# Patient Record
Sex: FEMALE | Race: WHITE | Employment: FULL TIME | URBAN - METROPOLITAN AREA
[De-identification: names, ages, dates, MRNs, and addresses within clinical notes are randomized per-mention and may not be internally consistent; named-entity substitution may affect disease eponyms.]

---

## 2016-02-15 LAB — INR PPP: 1 (ref 0.9–1.1)

## 2016-03-15 LAB
CHOLEST SERPL-MCNC: 182 MG/DL (ref 0–199)
GLUCOSE SERPL-MCNC: 93 MG/DL (ref 60–100)
HDLC SERPL-MCNC: 64 MG/DL
LDLC SERPL CALC-MCNC: 95 MG/DL (ref 19–130)
TRIGL SERPL-MCNC: 114 MG/DL (ref 4–149)

## 2016-06-16 LAB
HPV ABSTRACT: NORMAL
PAP-ABSTRACT: ABNORMAL

## 2017-03-07 ENCOUNTER — TRANSFERRED RECORDS (OUTPATIENT)
Dept: HEALTH INFORMATION MANAGEMENT | Facility: CLINIC | Age: 27
End: 2017-03-07

## 2017-03-07 LAB
CHOLEST SERPL-MCNC: 168 MG/DL (ref 0–199)
GLUCOSE SERPL-MCNC: 89 MG/DL (ref 70–100)
HDLC SERPL-MCNC: 59 MG/DL
HEP C HIM: NORMAL
LDLC SERPL CALC-MCNC: 81 MG/DL (ref 19–130)
TRIGL SERPL-MCNC: 141 MG/DL (ref 4–149)

## 2017-03-28 ENCOUNTER — TRANSFERRED RECORDS (OUTPATIENT)
Dept: HEALTH INFORMATION MANAGEMENT | Facility: CLINIC | Age: 27
End: 2017-03-28

## 2018-02-26 ENCOUNTER — OFFICE VISIT (OUTPATIENT)
Dept: FAMILY MEDICINE | Facility: CLINIC | Age: 28
End: 2018-02-26
Payer: COMMERCIAL

## 2018-02-26 VITALS
BODY MASS INDEX: 26.33 KG/M2 | HEART RATE: 77 BPM | WEIGHT: 158 LBS | DIASTOLIC BLOOD PRESSURE: 70 MMHG | HEIGHT: 65 IN | OXYGEN SATURATION: 99 % | RESPIRATION RATE: 16 BRPM | TEMPERATURE: 98 F | SYSTOLIC BLOOD PRESSURE: 102 MMHG

## 2018-02-26 DIAGNOSIS — Z30.9 ENCOUNTER FOR CONTRACEPTIVE MANAGEMENT, UNSPECIFIED TYPE: ICD-10-CM

## 2018-02-26 DIAGNOSIS — Z86.39 HISTORY OF IRON DEFICIENCY: ICD-10-CM

## 2018-02-26 DIAGNOSIS — J30.9 ALLERGIC RHINITIS, UNSPECIFIED CHRONICITY, UNSPECIFIED SEASONALITY, UNSPECIFIED TRIGGER: ICD-10-CM

## 2018-02-26 DIAGNOSIS — N92.0 MENORRHAGIA WITH REGULAR CYCLE: ICD-10-CM

## 2018-02-26 DIAGNOSIS — Z00.00 ENCOUNTER FOR ROUTINE ADULT HEALTH EXAMINATION WITHOUT ABNORMAL FINDINGS: Primary | ICD-10-CM

## 2018-02-26 DIAGNOSIS — Z71.84 TRAVEL ADVICE ENCOUNTER: ICD-10-CM

## 2018-02-26 LAB
BASOPHILS # BLD AUTO: 0 10E9/L (ref 0–0.2)
BASOPHILS NFR BLD AUTO: 0.5 %
DIFFERENTIAL METHOD BLD: ABNORMAL
EOSINOPHIL # BLD AUTO: 0.2 10E9/L (ref 0–0.7)
EOSINOPHIL NFR BLD AUTO: 2.2 %
ERYTHROCYTE [DISTWIDTH] IN BLOOD BY AUTOMATED COUNT: 15.7 % (ref 10–15)
HCT VFR BLD AUTO: 39.8 % (ref 35–47)
HGB BLD-MCNC: 13.2 G/DL (ref 11.7–15.7)
LYMPHOCYTES # BLD AUTO: 3.3 10E9/L (ref 0.8–5.3)
LYMPHOCYTES NFR BLD AUTO: 43.6 %
MCH RBC QN AUTO: 28.7 PG (ref 26.5–33)
MCHC RBC AUTO-ENTMCNC: 33.2 G/DL (ref 31.5–36.5)
MCV RBC AUTO: 87 FL (ref 78–100)
MONOCYTES # BLD AUTO: 0.7 10E9/L (ref 0–1.3)
MONOCYTES NFR BLD AUTO: 8.6 %
NEUTROPHILS # BLD AUTO: 3.4 10E9/L (ref 1.6–8.3)
NEUTROPHILS NFR BLD AUTO: 45.1 %
PLATELET # BLD AUTO: 313 10E9/L (ref 150–450)
RBC # BLD AUTO: 4.6 10E12/L (ref 3.8–5.2)
SPECIMEN SOURCE: NORMAL
WBC # BLD AUTO: 7.6 10E9/L (ref 4–11)
WET PREP SPEC: NORMAL

## 2018-02-26 PROCEDURE — 87210 SMEAR WET MOUNT SALINE/INK: CPT | Performed by: FAMILY MEDICINE

## 2018-02-26 PROCEDURE — 36415 COLL VENOUS BLD VENIPUNCTURE: CPT | Performed by: FAMILY MEDICINE

## 2018-02-26 PROCEDURE — G0145 SCR C/V CYTO,THINLAYER,RESCR: HCPCS | Performed by: FAMILY MEDICINE

## 2018-02-26 PROCEDURE — 82728 ASSAY OF FERRITIN: CPT | Performed by: FAMILY MEDICINE

## 2018-02-26 PROCEDURE — 87591 N.GONORRHOEAE DNA AMP PROB: CPT | Performed by: FAMILY MEDICINE

## 2018-02-26 PROCEDURE — 87491 CHLMYD TRACH DNA AMP PROBE: CPT | Performed by: FAMILY MEDICINE

## 2018-02-26 PROCEDURE — 85025 COMPLETE CBC W/AUTO DIFF WBC: CPT | Performed by: FAMILY MEDICINE

## 2018-02-26 PROCEDURE — 99395 PREV VISIT EST AGE 18-39: CPT | Performed by: FAMILY MEDICINE

## 2018-02-26 RX ORDER — CIPROFLOXACIN 500 MG/1
500 TABLET, FILM COATED ORAL 2 TIMES DAILY
Qty: 6 TABLET | Refills: 0 | Status: SHIPPED | OUTPATIENT
Start: 2018-02-26 | End: 2019-03-07

## 2018-02-26 NOTE — PROGRESS NOTES
SUBJECTIVE:   CC: Patricia Cat is an 27 year old woman who presents for preventive health visit. New patient, works for [employer]. Transfer of care from [facility].     Healthy Habits:    Do you get at least three servings of calcium containing foods daily (dairy, green leafy vegetables, etc.)? yes    Amount of exercise or daily activities, outside of work: 3 day(s) per week    Problems taking medications regularly not applicable    Medication side effects: No    Have you had an eye exam in the past two years? yes    Do you see a dentist twice per year? 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 Previous care at [facility]. She has requested records be sent here.    Past/recent records reviewed and discussed for --   -slip disk in lower lumbar region that she followed with PT for.  -allergies- followed with allergy testing. Primarily allergic to dust, dogs, and cats but there are many more.  - hx of low iron from menorrhagia that is now resolved.    Birth control/periods: Pt thinks ocp caused depression. When she d/c'ed ocp sx's completely resolved. Most recent ocp was trinessa. Pt has considered IUD but is worried about pain with insertion. Currently using condoms  -Periods occur every 3 weeks. The pill helped with cycle control. Pt says she feels like she loses a lot of hair and is tired, but thinks these are both within normal limits.   She has cramping in low abdomen- even while not on her period but it is very sporadic, mild. She had std screening completed at her physical last year and has been with the same partner, so she doesn't think there is concern for std exposure. Would like std screening completed today.     Allergies: She says ears have been itchy on the inside and is wondering if this is due to allergies. Admits to using  q-tips.  Using Flonase, but recently stopped using this because she was getting bloody noses.      Weight: has been stable for the last 1-2 years. She attends aerobic classes at the gym and lifts weight for exercise.   Wt Readings from Last 5 Encounters:   02/26/18 71.7 kg (158 lb)       Pt has sporadic cp and is unsure why. She says the feeling is fleeting and she is not concerned. Not sure what triggers the pain  Denies: cp with exertion, heart burn,     Others:   -While at work she cut her finger. She has been treated with anti-HIV med and is completing the f/u screening.    -Pt will be traveling to [place] in one month and is wondering what vaccines she needs to get.  -Supplementing daily vitamin with iron and vitamin D.      Today's PHQ-2 Score:   PHQ-2 ( 1999 Pfizer) 2/26/2018   Q1: Little interest or pleasure in doing things 0   Q2: Feeling down, depressed or hopeless 0   PHQ-2 Score 0       Abuse: Current or Past(Physical, Sexual or Emotional)- No  Do you feel safe in your environment - Yes    Social History   Substance Use Topics     Smoking status: Never Smoker     Smokeless tobacco: Never Used     Alcohol use Yes      Comment: 5 drinks/week     If you drink alcohol do you typically have >3 drinks per day or >7 drinks per week? Socially.                     Reviewed orders with patient.  Reviewed health maintenance and updated orders accordingly - Yes  Labs reviewed in EPIC  BP Readings from Last 3 Encounters:   02/26/18 102/70    Wt Readings from Last 3 Encounters:   02/26/18 71.7 kg (158 lb)                  Patient Active Problem List   Diagnosis     Allergic rhinitis, unspecified chronicity, unspecified seasonality, unspecified trigger     Past Surgical History:   Procedure Laterality Date     ORAL SURGERY REFERRAL      surgery       Social History   Substance Use Topics     Smoking status: Never Smoker     Smokeless tobacco: Never Used     Alcohol use Yes      Comment: 5 drinks/week     Family History  "  Problem Relation Age of Onset     HEART DISEASE Maternal Grandmother      Skin Cancer Maternal Grandfather      Alzheimer Disease Maternal Grandfather      Alzheimer Disease Paternal Grandmother            Mammogram not appropriate for this patient based on age.    Pertinent mammograms are reviewed under the imaging tab.  History of abnormal Pap smear: NO - age 21-29 PAP every 3 years recommended    Reviewed and updated as needed this visit by clinical staff  Tobacco  Allergies  Meds  Med Hx  Surg Hx  Fam Hx  Soc Hx        Reviewed and updated as needed this visit by Provider Tobacco  Allergies  Meds  Med Hx  Surg Hx  Fam Hx  Soc Hx             ROS:   ROS: 10 point ROS neg other than the symptoms noted above in the HPI.    This document serves as a record of the services and decisions personally performed and made by Debbie Tan MD. It was created on her behalf by Georgette Adamson, a trained medical scribe. The creation of this document is based the provider's statements to the medical scribe.  Georgette Adamson February 26, 2018 3:31 PM      OBJECTIVE:   /70 (BP Location: Right arm, Patient Position: Sitting, Cuff Size: Adult Regular)  Pulse 77  Temp 98  F (36.7  C) (Oral)  Resp 16  Ht 1.651 m (5' 5\")  Wt 71.7 kg (158 lb)  LMP 02/20/2018  SpO2 99%  BMI 26.29 kg/m2  EXAM:  GENERAL: healthy, alert and no distress, overweight  EYES: Eyes grossly normal to inspection, PERRL and conjunctivae and sclerae normal  HENT: ear canals and TM's normal, nose and mouth without ulcers or lesions  NECK: no adenopathy, no asymmetry, masses, or scars and thyroid normal to palpation  RESP: lungs clear to auscultation - no rales, rhonchi or wheezes  BREAST: normal without masses, tenderness or nipple discharge and no palpable axillary masses or adenopathy  CV: regular rate and rhythm, normal S1 S2, no S3 or S4, no murmur, click or rub, no peripheral edema and peripheral pulses strong, verucose " veins in RLE present  ABDOMEN: soft, nontender, no hepatosplenomegaly, no masses and bowel sounds normal   (female): normal female external genitalia, normal urethral meatus, vaginal mucosa pink, moist, well rugated, and normal cervix/adnexa/uterus without masses or discharge, mild tenderness  But no masses in adnexa bilaterally with internal exam  SKIN: no suspicious lesions or rashes  PSYCH: mentation appears normal, affect normal/bright    No results found for this or any previous visit (from the past 24 hour(s)).    ASSESSMENT/PLAN:   1. Encounter for routine adult health examination without abnormal findings  Consider pelvic u/s- no recent pain this month, so declines for now. Will update me if sx's intensify.   Avoid q-tips in ears.   - Wet prep  - Neisseria gonorrhoeae PCR  - Chlamydia trachomatis PCR  - Pap imaged thin layer screen reflex to HPV if ASCUS - recommend age 25 - 29    2. Allergic rhinitis, unspecified chronicity, unspecified seasonality, unspecified trigger  advised using Flonase one spray daily. Discussed spraying away from septum with cross arm technique to avoid nose bleeds.    3. Travel advice encounter   traveler's diarrhea- script for cipro given to be used prn while in [place]. Discussed bringing immodium.  Reviewed timing of taking, onset, benefits, monitoring and typicall and severe AE of the medication.   - ciprofloxacin (CIPRO) 500 MG tablet; Take 1 tablet (500 mg) by mouth 2 times daily  Dispense: 6 tablet; Refill: 0    4. History of iron deficiency  - CBC with platelets differential  - Ferritin    5. Menorrhagia with regular cycle  6. Encounter for contraceptive management, unspecified type   pt has tried oral contraceptives in the past with AE of depression so she d/c'ed. Discussed other forms of birth control with pt and she is  onsidering Mirena IUD. If she wishes to have IUD placed she will f/u with Dr. Tolentino or Dr. Shaw for insertion.  Side affects, use, risk, and  benefits discussed in detail, and pt. verbalizes understanding of these.         Patient Instructions   Recommend using oil drops for wax.     Use Flonase one spray daily-  opposite hand for opposite nostril.    Recommend compression stockings for verucose veins.    If you decide you would like IUD placed follow up with Dr. Tolentino or Dr. Shaw.     Preventive Health Recommendations  Female Ages 26 - 39  Yearly exam:   See your health care provider every year in order to    Review health changes.     Discuss preventive care.      Review your medicines if you your doctor has prescribed any.    Until age 30: Get a Pap test every three years (more often if you have had an abnormal result).    After age 30: Talk to your doctor about whether you should have a Pap test every 3 years or have a Pap test with HPV screening every 5 years.   You do not need a Pap test if your uterus was removed (hysterectomy) and you have not had cancer.  You should be tested each year for STDs (sexually transmitted diseases), if you're at risk.   Talk to your provider about how often to have your cholesterol checked.  If you are at risk for diabetes, you should have a diabetes test (fasting glucose).  Shots: Get a flu shot each year. Get a tetanus shot every 10 years.   Nutrition:     Eat at least 5 servings of fruits and vegetables each day.    Eat whole-grain bread, whole-wheat pasta and brown rice instead of white grains and rice.    Talk to your provider about Calcium and Vitamin D.     Lifestyle    Exercise at least 150 minutes a week (30 minutes a day, 5 days of the week). This will help you control your weight and prevent disease.    Limit alcohol to one drink per day.    No smoking.     Wear sunscreen to prevent skin cancer.    See your dentist every six months for an exam and cleaning.        COUNSELING:   Reviewed preventive health counseling, as reflected in patient instructions       Regular exercise       Healthy  "diet/nutrition       Vision screening       Hearing screening       Immunizations         Alcohol Use       Contraception       Family planning       Safe sex practices/STD prevention         reports that she has never smoked. She has never used smokeless tobacco.    Estimated body mass index is 26.29 kg/(m^2) as calculated from the following:    Height as of this encounter: 1.651 m (5' 5\").    Weight as of this encounter: 71.7 kg (158 lb).   Weight management plan: Discussed healthy diet and exercise guidelines and patient will follow up in 12 months in clinic to re-evaluate.    Counseling Resources:  ATP IV Guidelines  Pooled Cohorts Equation Calculator  Breast Cancer Risk Calculator  FRAX Risk Assessment  ICSI Preventive Guidelines  Dietary Guidelines for Americans, 2010  USDA's MyPlate  ASA Prophylaxis  Lung CA Screening    The information in this document, created by the medical scribe for me, accurately reflects the services I personally performed and the decisions made by me. I have reviewed and approved this document for accuracy.   MD Debbie Canchola MD  [facility]  "

## 2018-02-26 NOTE — MR AVS SNAPSHOT
After Visit Summary   2/26/2018    Patricia Cat    MRN: [id number]           Patient Information     Date Of Birth          1990        Visit Information        Provider Department      2/26/2018 3:20 PM Debbie Tan MD [facility]        Today's Diagnoses     Encounter for routine adult health examination without abnormal findings    -  1    Allergic rhinitis, unspecified chronicity, unspecified seasonality, unspecified trigger        Travel advice encounter        History of iron deficiency        Menorrhagia with regular cycle        Encounter for contraceptive management, unspecified type          Care Instructions    Recommend using oil drops for wax.     Use Flonase one spray daily-  opposite hand for opposite nostril.    Recommend compression stockings for verucose veins.    If you decide you would like IUD placed follow up with Dr. Tolentino or Dr. Shaw.     Preventive Health Recommendations  Female Ages 26 - 39  Yearly exam:   See your health care provider every year in order to    Review health changes.     Discuss preventive care.      Review your medicines if you your doctor has prescribed any.    Until age 30: Get a Pap test every three years (more often if you have had an abnormal result).    After age 30: Talk to your doctor about whether you should have a Pap test every 3 years or have a Pap test with HPV screening every 5 years.   You do not need a Pap test if your uterus was removed (hysterectomy) and you have not had cancer.  You should be tested each year for STDs (sexually transmitted diseases), if you're at risk.   Talk to your provider about how often to have your cholesterol checked.  If you are at risk for diabetes, you should have a diabetes test (fasting glucose).  Shots: Get a flu shot each year. Get a tetanus shot every 10 years.   Nutrition:     Eat at least 5 servings of fruits and vegetables each day.    Eat whole-grain  "bread, whole-wheat pasta and brown rice instead of white grains and rice.    Talk to your provider about Calcium and Vitamin D.     Lifestyle    Exercise at least 150 minutes a week (30 minutes a day, 5 days of the week). This will help you control your weight and prevent disease.    Limit alcohol to one drink per day.    No smoking.     Wear sunscreen to prevent skin cancer.    See your dentist every six months for an exam and cleaning.            Follow-ups after your visit        Who to contact     If you have questions or need follow up information about today's clinic visit or your schedule please contact [facility] directly at [phone].  Normal or non-critical lab and imaging results will be communicated to you by MyChart, letter or phone within 4 business days after the clinic has received the results. If you do not hear from us within 7 days, please contact the clinic through [employer] or phone. If you have a critical or abnormal lab result, we will notify you by phone as soon as possible.  Submit refill requests through [employer] or call your pharmacy and they will forward the refill request to us. Please allow 3 business days for your refill to be completed.          Additional Information About Your Visit        Infer[employer] Information     [employer] lets you send messages to your doctor, view your test results, renew your prescriptions, schedule appointments and more. To sign up, go to [email or web address] . Click on \"Log in\" on the left side of the screen, which will take you to the Welcome page. Then click on \"Sign up Now\" on the right side of the page.     You will be asked to enter the access code listed below, as well as some personal information. Please follow the directions to create your username and password.     Your access code is: [email or web address]  Expires: 2018  4:12 PM     Your access code will  in 90 days. If you need help or a new code, please call your [facility] clinic " "or [phone].        Care EveryWhere ID     This is your Care EveryWhere ID. This could be used by other organizations to access your [facility] medical records  [id number]        Your Vitals Were     Pulse Temperature Respirations Height Last Period Pulse Oximetry    77 98  F (36.7  C) (Oral) 16 1.651 m (5' 5\") 02/20/2018 99%    BMI (Body Mass Index)                   26.29 kg/m2            Blood Pressure from Last 3 Encounters:   02/26/18 102/70    Weight from Last 3 Encounters:   02/26/18 71.7 kg (158 lb)              We Performed the Following     CBC with platelets differential     Chlamydia trachomatis PCR     Ferritin     Neisseria gonorrhoeae PCR     Pap imaged thin layer screen reflex to HPV if ASCUS - recommend age 25 - 29     Wet prep          Today's Medication Changes          These changes are accurate as of 2/26/18  4:12 PM.  If you have any questions, ask your nurse or doctor.               Start taking these medicines.        Dose/Directions    ciprofloxacin 500 MG tablet   Commonly known as:  CIPRO   Used for:  Travel advice encounter   Started by:  Debbie Tan MD        Dose:  500 mg   Take 1 tablet (500 mg) by mouth 2 times daily   Quantity:  6 tablet   Refills:  0            Where to get your medicines      These medications were sent to [facility] - [address]  [address] [facility] [address], [place] [address]     Phone:  [phone]     ciprofloxacin 500 MG tablet                Primary Care Provider Office Phone # Fax #    [facility] [phone] [address] [facility] [address]        Equal Access to Services     [facility]: Hadii aad ku hadasho Sovicente, waaxda luqadaha, qaybta kaalmada jihan moffett . So [facility] [phone].    ATENCIÓN: Si habla español, tiene a gutiérrez disposición servicios gratuitos de asistencia lingüística. Llame al [phone].    We comply " with applicable federal civil rights laws and [place] laws. We do not discriminate on the basis of race, color, national origin, age, disability, sex, sexual orientation, or gender identity.            Thank you!     Thank you for choosing [facility]  for your care. Our goal is always to provide you with excellent care. Hearing back from our patients is one way we can continue to improve our services. Please take a few minutes to complete the written survey that you may receive in the mail after your visit with us. Thank you!             Your Updated Medication List - Protect others around you: Learn how to safely use, store and throw away your medicines at www.disposemymeds.org.          This list is accurate as of 2/26/18  4:12 PM.  Always use your most recent med list.                   Brand Name Dispense Instructions for use Diagnosis    ciprofloxacin 500 MG tablet    CIPRO    6 tablet    Take 1 tablet (500 mg) by mouth 2 times daily    Travel advice encounter

## 2018-02-26 NOTE — NURSING NOTE
"Chief Complaint   Patient presents with     Physical       Initial Ht 1.651 m (5' 5\")  Wt 71.7 kg (158 lb)  LMP 02/20/2018  BMI 26.29 kg/m2 Estimated body mass index is 26.29 kg/(m^2) as calculated from the following:    Height as of this encounter: 1.651 m (5' 5\").    Weight as of this encounter: 71.7 kg (158 lb).  Medication Reconciliation: conrado Sanders, Medical Assistant      "

## 2018-02-26 NOTE — PATIENT INSTRUCTIONS
Recommend using oil drops for wax.     cipro for travel- Start this antibiotic if you develop three or more loose stools in an eight hour period, especially if the diarrhea is associated with nausea, vomiting, abdominal cramping, fever or blood in the stool.       Use Flonase one spray daily-  opposite hand for opposite nostril.    Recommend compression stockings for verucose veins.    If you decide you would like IUD placed follow up with Dr. Tolentino or Dr. Shaw.     Preventive Health Recommendations  Female Ages 26 - 39  Yearly exam:   See your health care provider every year in order to    Review health changes.     Discuss preventive care.      Review your medicines if you your doctor has prescribed any.    Until age 30: Get a Pap test every three years (more often if you have had an abnormal result).    After age 30: Talk to your doctor about whether you should have a Pap test every 3 years or have a Pap test with HPV screening every 5 years.   You do not need a Pap test if your uterus was removed (hysterectomy) and you have not had cancer.  You should be tested each year for STDs (sexually transmitted diseases), if you're at risk.   Talk to your provider about how often to have your cholesterol checked.  If you are at risk for diabetes, you should have a diabetes test (fasting glucose).  Shots: Get a flu shot each year. Get a tetanus shot every 10 years.   Nutrition:     Eat at least 5 servings of fruits and vegetables each day.    Eat whole-grain bread, whole-wheat pasta and brown rice instead of white grains and rice.    Talk to your provider about Calcium and Vitamin D.     Lifestyle    Exercise at least 150 minutes a week (30 minutes a day, 5 days of the week). This will help you control your weight and prevent disease.    Limit alcohol to one drink per day.    No smoking.     Wear sunscreen to prevent skin cancer.    See your dentist every six months for an exam and cleaning.

## 2018-02-27 LAB
C TRACH DNA SPEC QL NAA+PROBE: NEGATIVE
FERRITIN SERPL-MCNC: 22 NG/ML (ref 12–150)
N GONORRHOEA DNA SPEC QL NAA+PROBE: NEGATIVE
SPECIMEN SOURCE: NORMAL
SPECIMEN SOURCE: NORMAL

## 2018-02-28 LAB
COPATH REPORT: NORMAL
PAP: NORMAL

## 2018-03-15 ENCOUNTER — VIRTUAL VISIT (OUTPATIENT)
Dept: FAMILY MEDICINE | Facility: OTHER | Age: 28
End: 2018-03-15

## 2018-03-15 NOTE — PROGRESS NOTES
"Date:   Clinician: Janie Barros  Clinician NPI: [id number]  Patient: Patricia Cat  Patient : 1990  Patient Address: [address] [place], [place] [address] [address]  Patient Phone: [phone]  Visit Protocol: Yeast infection  Patient Summary:  Patricia is a 27 year old ( : 1990 ) female who initiated a Visit for a presumed vaginal yeast infection. When asked the question \"Please sign me up to receive news, health information and promotions from [employer].\", Patricia responded \"No\".    0-5 days ago, she began noticing perivulvar pruritus.   She denies having open sores, perivulvar rash, vaginal pruritus, vaginal discharge, and abdominal pain. She also denies feeling feverish.   She has had one (1) occurrence in the past year and the symptoms are NOT similar to previous yeast infections. She has not tried to treat her current symptoms with any medication.   She denies taking antibiotics in the past 2 weeks. She prefers a fluconazole (Diflucan) Pill.   She denies pregnancy and denies breastfeeding. She has menstruated in the past month.   She denies risk factors for sexually transmitted infections. She does NOT smoke or use smokeless tobacco.   Additional information as reported by the patient (free text): This started on 3/13/2018 and have been taking AZO yeast plus. I don't have much itching, just burning when I urinate. It seems to be centralized between my vagina and anus. I do notice the toilet paper is a little pink when I wipe so I suspect there is some very slight bleeding but it doesn't happen every time. I also have no discharge at all, everything is very dry. My most recent doctors visit was on 2018 and I had an STD screen and I was negative, there is no chance that could be it.     MEDICATIONS:    Patient free text response: Xyzal   , ALLERGIES:  NKDA   Clinician Response:  Dear Patricia,  Based on the information you have provided, you likely have a vaginal yeast infection which is " a common infection of the vagina caused by a fungus.  I am prescribing:   Fluconazole (Diflucan) 150 mg oral tablet to treat your yeast infection. Swallow one (1) tablet as a single dose. There are no refills with this prescription.   While you have yeast infection symptoms, do the following:      Avoid irritants such as scented bath products, tampons, pads, or vaginal sprays and powders.    Avoid douching.    Wear cotton underwear and if you are comfortable doing so, do not wear underwear to bed.    Avoid hot tubs and whirlpool spas.     Most women notice improvement in their symptoms within 1-2 days after starting treatment with complete clearing in 5-7 days. If your symptoms have not improved in 3 days or not resolved in 10 days, please schedule an appointment to see your primary care provider for an evaluation as your symptoms may be caused by an infection other than yeast. Sometimes yeast infections can be associated with other vaginal infections or with sexually transmitted infections (STIs). If you think you may be at risk for a STI please be seen in a clinic.   Diagnosis: Candida Vulvovaginitis  Diagnosis ICD: B37.3  Prescription: fluconazole (Diflucan) 150mg oral tablet 1 tablet, 1 days supply. Take one tablet by mouth one time a day for 1 day. Refills: 0, Refill as needed: no, Allow substitutions: yes  Pharmacy: [id number] - [phone] - [address] [place] [address]

## 2018-06-04 ENCOUNTER — OFFICE VISIT (OUTPATIENT)
Dept: URGENT CARE | Facility: URGENT CARE | Age: 28
End: 2018-06-04
Payer: COMMERCIAL

## 2018-06-04 VITALS
SYSTOLIC BLOOD PRESSURE: 111 MMHG | RESPIRATION RATE: 16 BRPM | HEART RATE: 62 BPM | TEMPERATURE: 97.8 F | DIASTOLIC BLOOD PRESSURE: 71 MMHG | OXYGEN SATURATION: 100 %

## 2018-06-04 DIAGNOSIS — J03.90 TONSILLITIS: ICD-10-CM

## 2018-06-04 DIAGNOSIS — R07.0 THROAT PAIN: Primary | ICD-10-CM

## 2018-06-04 LAB
DEPRECATED S PYO AG THROAT QL EIA: NORMAL
SPECIMEN SOURCE: NORMAL

## 2018-06-04 PROCEDURE — 99213 OFFICE O/P EST LOW 20 MIN: CPT

## 2018-06-04 PROCEDURE — 87880 STREP A ASSAY W/OPTIC: CPT | Performed by: FAMILY MEDICINE

## 2018-06-04 PROCEDURE — 87081 CULTURE SCREEN ONLY: CPT | Performed by: FAMILY MEDICINE

## 2018-06-04 NOTE — MR AVS SNAPSHOT
After Visit Summary   6/4/2018    Patricia Cat    MRN: [id number]           Patient Information     Date Of Birth          1990        Visit Information        Provider Department      6/4/2018 7:30 PM  URGENT CARE [facility]        Today's Diagnoses     Throat pain    -  1    Tonsillitis          Care Instructions      Tonsillitis (Child)    Tonsillitis is an inflammation or infection of your child's tonsils. Your child has 2 tonsils, 1 on either side of his or her throat. The tonsils are large, oval glands. They help prevent infections. But tonsils can become infected themselves. Tonsillitis is a common childhood condition.  Tonsillitis can be caused by bacteria or a virus. The main symptom is a sore throat. Your child may also have a fever, throat redness or swelling, or trouble swallowing. The tonsils may also look white, gray, or yellow.  If your child has a bacterial infection, antibiotics may be prescribed. Antibiotics don t work against viral infections. In some cases of a viral infection, an antiviral medicine may be prescribed. Once the problem has been treated, your child may need surgery to remove the tonsils if they become infected often or cause breathing problems.  Home care  If your child s healthcare provider has prescribed antibiotics or another medicine, give it to your child as directed. Be sure your child finishes all of the medicine, even if he or she feels better.  To help ease your child s sore throat:    Give acetaminophen or ibuprofen. Follow the package instructions for giving these to a child. (Do not give aspirin to anyone younger than 18 years old who is ill with a fever. It may cause severe liver damage.)    Offer cool liquids to drink.    Have your child gargle with warm salt water. An over-the-counter throat-numbing spray may also help.  The germs that cause tonsillitis are very contagious. To help prevent their spread, follow these  tips:    Teach your child to wash his or her hands often.    Don t let your child share cups or utensils with other people.    Keep your child away from other children until he or she is better.  Follow-up care  Follow up with your child's healthcare provider, or as advised.  When to seek medical advice  Unless advised otherwise, call your child's healthcare provider if:    Your child is 3 months old or younger and has a fever of 100.4 F (38 C) or higher. Your child may need to see a healthcare provider.    Your child is of any age and has fevers higher than 104 F (40 C) that come back again and again.  Also call if any of the following occur:    Child has a sore throat for more than 2 days    Child has a sore throat with fever, headache, stomachache, or rash    Child has neck pain    Child has a seizure    Child is acting strangely    Child has trouble swallowing or breathing    Child can t open his or her mouth fully  Date Last Reviewed: 10/1/2017    [id number] The [employer]. [address] [place] [address]. All rights reserved. This information is not intended as a substitute for professional medical care. Always follow your healthcare professional's instructions.                Follow-ups after your visit        Who to contact     If you have questions or need follow up information about today's clinic visit or your schedule please contact [facility] directly at [phone].  Normal or non-critical lab and imaging results will be communicated to you by MyChart, letter or phone within 4 business days after the clinic has received the results. If you do not hear from us within 7 days, please contact the clinic through MyChart or phone. If you have a critical or abnormal lab result, we will notify you by phone as soon as possible.  Submit refill requests through [employer] or call your pharmacy and they will forward the refill request to us. Please allow 3 business days  for your refill to be completed.          Additional Information About Your Visit        MyChart Information     [employer] gives you secure access to your electronic health record. If you see a primary care provider, you can also send messages to your care team and make appointments. If you have questions, please call your primary care clinic.  If you do not have a primary care provider, please call [phone] and they will assist you.        Care EveryWhere ID     This is your Care EveryWhere ID. This could be used by other organizations to access your [facility] medical records  [id number]        Your Vitals Were     Pulse Temperature Respirations Pulse Oximetry          62 97.8  F (36.6  C) (Tympanic) 16 100%         Blood Pressure from Last 3 Encounters:   06/04/18 111/71   02/26/18 102/70    Weight from Last 3 Encounters:   02/26/18 158 lb (71.7 kg)              We Performed the Following     Strep, Rapid Screen          Today's Medication Changes          These changes are accurate as of 6/4/18  8:05 PM.  If you have any questions, ask your nurse or doctor.               Start taking these medicines.        Dose/Directions    amoxicillin-clavulanate 875-125 MG per tablet   Commonly known as:  AUGMENTIN   Used for:  Tonsillitis        Dose:  1 tablet   Take 1 tablet by mouth 2 times daily   Quantity:  20 tablet   Refills:  0            Where to get your medicines      These medications were sent to [facility] [address] [place] [address] [address] [place] [id number]    Hours:  24-hours Phone:  [phone]     amoxicillin-clavulanate 875-125 MG per tablet                Primary Care Provider Office Phone # Fax #    [facility] [phone] [address] [facility] [address]        Equal Access to Services     IOANA MAST AH: Zoie Jack, balta leon, jihan pace  karely dugganCliftonaaheydi ah. So [facility] [phone].    ATENCIÓN: Si habla javid, tiene a gutiérrez disposición servicios gratuitos de asistencia lingüística. Ester al [phone].    We comply with applicable federal civil rights laws and [place] laws. We do not discriminate on the basis of race, color, national origin, age, disability, sex, sexual orientation, or gender identity.            Thank you!     Thank you for choosing [facility]  for your care. Our goal is always to provide you with excellent care. Hearing back from our patients is one way we can continue to improve our services. Please take a few minutes to complete the written survey that you may receive in the mail after your visit with us. Thank you!             Your Updated Medication List - Protect others around you: Learn how to safely use, store and throw away your medicines at www.disposemymeds.org.          This list is accurate as of 6/4/18  8:05 PM.  Always use your most recent med list.                   Brand Name Dispense Instructions for use Diagnosis    amoxicillin-clavulanate 875-125 MG per tablet    AUGMENTIN    20 tablet    Take 1 tablet by mouth 2 times daily    Tonsillitis       ciprofloxacin 500 MG tablet    CIPRO    6 tablet    Take 1 tablet (500 mg) by mouth 2 times daily    Travel advice encounter

## 2018-06-05 LAB
BACTERIA SPEC CULT: NORMAL
SPECIMEN SOURCE: NORMAL

## 2018-06-05 NOTE — PROGRESS NOTES
SUBJECTIVE:  Chief Complaint   Patient presents with     Urgent Care     Mild stratchy and white throat     Patricia Cat is a 27 year old female   with a chief complaint of sore throat.  Onset of symptoms was 1 day(s) ago.    Course of illness: sudden onset, still present and worsening.  Severity moderate  Current and Associated symptoms: sore throat and headache  Treatment measures tried include None tried.  Predisposing factors include None.  Has   history of recurrent tonsillitis    Past Medical History:   Diagnosis Date     Anxiety      Chlamydia      Depression     stopped ocp (trinessa) and sx's improved. anorgasmia from zoloft, celexa 40 mg was ineffective.      Disc disorder     low spine, s/p PT     Iron deficiency anemia     thought due to heavy menses in past     Lumbosacral radiculopathy at L5      Patient Active Problem List   Diagnosis     Allergic rhinitis, unspecified chronicity, unspecified seasonality, unspecified trigger     Menorrhagia with regular cycle     History of iron deficiency         ALLERGIES:  Review of patient's allergies indicates no known allergies.      Current Outpatient Prescriptions on File Prior to Visit:  ciprofloxacin (CIPRO) 500 MG tablet Take 1 tablet (500 mg) by mouth 2 times daily     No current facility-administered medications on file prior to visit.     Social History   Substance Use Topics     Smoking status: Never Smoker     Smokeless tobacco: Never Used     Alcohol use Yes      Comment: 5 drinks/week       Family History   Problem Relation Age of Onset     HEART DISEASE Maternal Grandmother      Skin Cancer Maternal Grandfather      Alzheimer Disease Maternal Grandfather      Alzheimer Disease Paternal Grandmother          ROS:  CONSTITUTIONAL:NEGATIVE for fever, chills, change in weight  INTEGUMENTARY/SKIN: NEGATIVE for worrisome rashes, moles or lesions  EYES: NEGATIVE for vision changes or irritation  RESP:NEGATIVE for significant cough or SOB    OBJECTIVE:    /71  Pulse 62  Temp 97.8  F (36.6  C) (Tympanic)  Resp 16  SpO2 100%  GENERAL APPEARANCE: alert, moderate distress and cooperative  HENT: Right side has tonsillar hypertrophy, tonsillar erythema and tonsillar exudate   HENT: ear canals and TM's normal.  Nose normal.  Pharynx erythematous with some exudate noted.,  NECK: supple, non-tender to palpation, no adenopathy noted  EYES: EOMI,  PERRL, conjunctiva clear  SKIN- no rashes or sores  RESP: lungs clear to auscultation - no rales, rhonchi or wheezes  CV: regular rates and rhythm, normal S1 S2, no murmer noted, ,SKIN: no suspicious lesions or rashes  MS-  Motor and sensation in extremities appears grossly normal  NEURO-  Speech, mentation, mobility seems normal and appropriate      Rapid Strep test is negative    ASSESSMENT:  Throat pain     - Strep, Rapid Screen  - Beta strep group A culture    Tonsillitis      - amoxicillin-clavulanate (AUGMENTIN) 875-125 MG per tablet; Take 1 tablet by mouth 2 times daily     I discussed with the patient that a confirmatory strep culture will be performed and that she will be called if the culture is positive for strep.    We discussed   possible causes of tonsillitis besides strep throat including bacterial tonsillitis,  cold viruses and mononucleosis.    Discussed that  Tonsillitis caused by mono or cold viruses will not respond to antibiotics    We discussed that there is no test for bacterial tonsillitis and that the diagnosis is confirmed if the inflamed tonsils respond to antibiotic treatment.  Bacterial tonsillitis tends to develop in people with tonsils that have a surface that lets food particles get trapped in the tonsil or when there are other infections in the mouth that spread to the tonsil.    Discussed that people with chronic bacterial tonsillitis often have tonsilliths-  Whitish  Lumps in the crypts of the tonsils that are a mixture of food, bacteria and dead mucosal cells    Symptomatic treatment  with gargles, lozenges, and OTC analgesic (acetaminophen/ ibuprofen)   as needed. Follow-up with primary clinic if not improving.

## 2019-03-04 ASSESSMENT — ENCOUNTER SYMPTOMS
HEMATOCHEZIA: 0
JOINT SWELLING: 0
HEADACHES: 0
PALPITATIONS: 0
HEMATURIA: 0
MYALGIAS: 0
ABDOMINAL PAIN: 0
FREQUENCY: 0
PARESTHESIAS: 0
FEVER: 0
DIZZINESS: 0
NAUSEA: 0
WEAKNESS: 0
SHORTNESS OF BREATH: 0
COUGH: 0
DIARRHEA: 0
CONSTIPATION: 0
SORE THROAT: 0
NERVOUS/ANXIOUS: 0
CHILLS: 0
HEARTBURN: 0
DYSURIA: 0
ARTHRALGIAS: 0
EYE PAIN: 0
BREAST MASS: 0

## 2019-03-07 ENCOUNTER — OFFICE VISIT (OUTPATIENT)
Dept: FAMILY MEDICINE | Facility: CLINIC | Age: 29
End: 2019-03-07
Payer: COMMERCIAL

## 2019-03-07 VITALS
OXYGEN SATURATION: 100 % | HEART RATE: 78 BPM | TEMPERATURE: 98.1 F | HEIGHT: 65 IN | RESPIRATION RATE: 16 BRPM | SYSTOLIC BLOOD PRESSURE: 102 MMHG | WEIGHT: 166 LBS | DIASTOLIC BLOOD PRESSURE: 72 MMHG | BODY MASS INDEX: 27.66 KG/M2

## 2019-03-07 DIAGNOSIS — R06.81 APNEA: ICD-10-CM

## 2019-03-07 DIAGNOSIS — N92.0 MENORRHAGIA WITH REGULAR CYCLE: ICD-10-CM

## 2019-03-07 DIAGNOSIS — N92.0 SPOTTING: ICD-10-CM

## 2019-03-07 DIAGNOSIS — L68.9 EXCESSIVE HAIR GROWTH: ICD-10-CM

## 2019-03-07 DIAGNOSIS — Z00.00 ENCOUNTER FOR ROUTINE ADULT HEALTH EXAMINATION WITHOUT ABNORMAL FINDINGS: Primary | ICD-10-CM

## 2019-03-07 DIAGNOSIS — R06.83 SNORING: ICD-10-CM

## 2019-03-07 LAB
CHOLEST SERPL-MCNC: 151 MG/DL
FERRITIN SERPL-MCNC: 31 NG/ML (ref 12–150)
GLUCOSE SERPL-MCNC: 89 MG/DL (ref 70–99)
HDLC SERPL-MCNC: 63 MG/DL
HGB BLD-MCNC: 13.6 G/DL (ref 11.7–15.7)
LDLC SERPL CALC-MCNC: 80 MG/DL
NONHDLC SERPL-MCNC: 88 MG/DL
PROLACTIN SERPL-MCNC: 8 UG/L (ref 3–27)
SPECIMEN SOURCE: NORMAL
TRIGL SERPL-MCNC: 42 MG/DL
TSH SERPL DL<=0.005 MIU/L-ACNC: 1.48 MU/L (ref 0.4–4)
WET PREP SPEC: NORMAL

## 2019-03-07 PROCEDURE — 82627 DEHYDROEPIANDROSTERONE: CPT | Performed by: FAMILY MEDICINE

## 2019-03-07 PROCEDURE — 99213 OFFICE O/P EST LOW 20 MIN: CPT | Mod: 25 | Performed by: FAMILY MEDICINE

## 2019-03-07 PROCEDURE — 87591 N.GONORRHOEAE DNA AMP PROB: CPT | Performed by: FAMILY MEDICINE

## 2019-03-07 PROCEDURE — 84443 ASSAY THYROID STIM HORMONE: CPT | Performed by: FAMILY MEDICINE

## 2019-03-07 PROCEDURE — 87491 CHLMYD TRACH DNA AMP PROBE: CPT | Performed by: FAMILY MEDICINE

## 2019-03-07 PROCEDURE — 36415 COLL VENOUS BLD VENIPUNCTURE: CPT | Performed by: FAMILY MEDICINE

## 2019-03-07 PROCEDURE — 99395 PREV VISIT EST AGE 18-39: CPT | Mod: 25 | Performed by: FAMILY MEDICINE

## 2019-03-07 PROCEDURE — 84403 ASSAY OF TOTAL TESTOSTERONE: CPT | Performed by: FAMILY MEDICINE

## 2019-03-07 PROCEDURE — 80061 LIPID PANEL: CPT | Performed by: FAMILY MEDICINE

## 2019-03-07 PROCEDURE — 82947 ASSAY GLUCOSE BLOOD QUANT: CPT | Performed by: FAMILY MEDICINE

## 2019-03-07 PROCEDURE — 90471 IMMUNIZATION ADMIN: CPT | Performed by: FAMILY MEDICINE

## 2019-03-07 PROCEDURE — 85018 HEMOGLOBIN: CPT | Performed by: FAMILY MEDICINE

## 2019-03-07 PROCEDURE — 87210 SMEAR WET MOUNT SALINE/INK: CPT | Performed by: FAMILY MEDICINE

## 2019-03-07 PROCEDURE — 90714 TD VACC NO PRESV 7 YRS+ IM: CPT | Performed by: FAMILY MEDICINE

## 2019-03-07 PROCEDURE — 82728 ASSAY OF FERRITIN: CPT | Performed by: FAMILY MEDICINE

## 2019-03-07 PROCEDURE — 84146 ASSAY OF PROLACTIN: CPT | Performed by: FAMILY MEDICINE

## 2019-03-07 ASSESSMENT — ENCOUNTER SYMPTOMS
CHILLS: 0
EYE PAIN: 0
PALPITATIONS: 0
HEADACHES: 0
COUGH: 0
ARTHRALGIAS: 0
JOINT SWELLING: 0
FREQUENCY: 0
HEMATOCHEZIA: 0
DYSURIA: 0
SHORTNESS OF BREATH: 0
FEVER: 0
NERVOUS/ANXIOUS: 0
HEMATURIA: 0
MYALGIAS: 0
HEARTBURN: 0
SORE THROAT: 0
WEAKNESS: 0
BREAST MASS: 0
PARESTHESIAS: 0
ABDOMINAL PAIN: 0
DIARRHEA: 0
CONSTIPATION: 0
NAUSEA: 0
DIZZINESS: 0

## 2019-03-07 ASSESSMENT — MIFFLIN-ST. JEOR: SCORE: 1483.85

## 2019-03-07 NOTE — PATIENT INSTRUCTIONS
Please call [facility] (formerly called [facility]) at [phone] to schedule your pelvic ultrasound.     Schedule an appointment with the sleep clinic.

## 2019-03-07 NOTE — NURSING NOTE
Screening Questionnaire for Adult Immunization    Are you sick today?   No   Do you have allergies to medications, food, a vaccine component or latex?   No   Have you ever had a serious reaction after receiving a vaccination?   No   Do you have a long-term health problem with heart disease, lung disease, asthma, kidney disease, metabolic disease (e.g. diabetes), anemia, or other blood disorder?   No   Do you have cancer, leukemia, HIV/AIDS, or any other immune system problem?   No   In the past 3 months, have you taken medications that affect  your immune system, such as prednisone, other steroids, or anticancer drugs; drugs for the treatment of rheumatoid arthritis, Crohn s disease, or psoriasis; or have you had radiation treatments?   No   Have you had a seizure, or a brain or other nervous system problem?   No   During the past year, have you received a transfusion of blood or blood     products, or been given immune (gamma) globulin or antiviral drug?   No   For women: Are you pregnant or is there a chance you could become        pregnant during the next month?   No   Have you received any vaccinations in the past 4 weeks?   No     Immunization questionnaire answers were all negative.      Patient instructed to remain in clinic for 15 minutes afterwards, and to report any adverse reaction to me immediately.       Screening performed by Lucille Sanders on 3/7/2019 at 9:55 AM.

## 2019-03-07 NOTE — PROGRESS NOTES
SUBJECTIVE:   CC: Patricia Cat is an 28 year old woman who presents for preventive health visit.     Physical   Annual:     Getting at least 3 servings of Calcium per day:  Yes    Bi-annual eye exam:  Yes    Dental care twice a year:  Yes    Sleep apnea or symptoms of sleep apnea:  None    Diet:  Regular (no restrictions)    Frequency of exercise:  2-3 days/week    Duration of exercise:  30-45 minutes    Taking medications regularly:  Yes    Medication side effects:  Not applicable    Additional concerns today:  Yes    PHQ-2 Total Score: 0    Vaginal bleeding:  -Patient has been noticing bleeding/spotting after intercourse for the last 7 months; for the last 5 months it has been almost every time. She does not have any spotting apart from with intercourse. Patient has not had significant pain during intercourse, she has infrequent occasional mild pain resolved with changing position   -Complains of diminished libido, thinks it may be due to stress from the bleeding   -Denies vaginal discharge or odor, itching, dysuria   -Using condoms for contraception, has tried switching brands to see if that would make a difference but it did not help   -Last year at physical exam patient mentioned intermittent cramping in abdomen, this has resolved  -Periods are regular and patient denies heavy bleeding     Mood:  -Overall her mood has been stable, patient is stressed about an upcoming exam but other than that she is doing well     Weight:  -Patient thinks she has gained weight so she is trying to exercise more and make dietary changes. She has significantly reduced her alcohol intake so is hoping that will help her weight     Wt Readings from Last 4 Encounters:   03/07/19 75.3 kg (166 lb)   02/26/18 71.7 kg (158 lb)     Additional:  -Patient noticed after she stopped her oral contraceptive she had more dark hair on her neck. She finds occasional hairs on her chest as well. Her sister has similar hair growth but her  mother does not   -Complains of post-nasal drainage  -Stopped Flonase due to epistaxis  -Takes xyzal daily  -Has noticed occasional apneic episodes at night. Patient also snores. Patient feels refreshed most mornings if she gets enough sleep, but does feel tired during the day, but that is not new for her    Today's PHQ-2 Score:   PHQ-2 ( 1999 [employer]) 3/4/2019   Q1: Little interest or pleasure in doing things 0   Q2: Feeling down, depressed or hopeless 0   PHQ-2 Score 0   Q1: Little interest or pleasure in doing things Not at all   Q2: Feeling down, depressed or hopeless Not at all   PHQ-2 Score 0       Abuse: Current or Past(Physical, Sexual or Emotional)- No  Do you feel safe in your environment? Yes    Social History     Tobacco Use     Smoking status: Never Smoker     Smokeless tobacco: Never Used   Substance Use Topics     Alcohol use: Yes     Comment: 0-2 drinks/week     Alcohol Use 3/4/2019   If you drink alcohol do you typically have greater than 3 drinks per day OR greater than 7 drinks per week? No   No flowsheet data found.    Reviewed orders with patient.  Reviewed health maintenance and updated orders accordingly - Yes  Patient Active Problem List   Diagnosis     Allergic rhinitis, unspecified chronicity, unspecified seasonality, unspecified trigger     Menorrhagia with regular cycle     History of iron deficiency     Past Surgical History:   Procedure Laterality Date     ORAL SURGERY REFERRAL      surgery       Social History     Tobacco Use     Smoking status: Never Smoker     Smokeless tobacco: Never Used   Substance Use Topics     Alcohol use: Yes     Comment: 0-2 drinks/week     Family History   Problem Relation Age of Onset     Heart Disease Maternal Grandmother      Skin Cancer Maternal Grandfather      Alzheimer Disease Maternal Grandfather      Alzheimer Disease Paternal Grandmother            Mammogram not appropriate for this patient based on age.    Pertinent mammograms are reviewed under  "the imaging tab.  History of abnormal Pap smear: NO - age 30- 65 PAP every 3 years recommended  PAP / HPV 2/26/2018   PAP NIL     Reviewed and updated as needed this visit by clinical staff  Tobacco  Allergies  Meds         Reviewed and updated as needed this visit by Provider            Review of Systems   Constitutional: Negative for chills and fever.   HENT: Negative for congestion, ear pain, hearing loss and sore throat.    Eyes: Negative for pain.   Respiratory: Negative for cough and shortness of breath.    Cardiovascular: Negative for chest pain, palpitations and peripheral edema.   Gastrointestinal: Negative for abdominal pain, constipation, diarrhea, heartburn, hematochezia and nausea.   Breasts:  Negative for tenderness, breast mass and discharge.   Genitourinary: Positive for vaginal bleeding. Negative for dysuria, frequency, genital sores, hematuria, pelvic pain, urgency and vaginal discharge.   Musculoskeletal: Negative for arthralgias, joint swelling and myalgias.   Skin: Negative for rash.   Neurological: Negative for dizziness, weakness, headaches and paresthesias.   Psychiatric/Behavioral: Negative for mood changes. The patient is not nervous/anxious.      This document serves as a record of the services and decisions personally performed by BONILLA HINKLE. It was created on his/her behalf by Sanjana Adam, a trained medical scribe. The creation of this document is based on the provider's statements to the medical scribe. Sanjana Adam, March 7, 2019 9:21 AM  OBJECTIVE:   /72 (BP Location: Right arm, Patient Position: Sitting, Cuff Size: Adult Large)   Pulse 78   Temp 98.1  F (36.7  C) (Oral)   Resp 16   Ht 1.651 m (5' 5\")   Wt 75.3 kg (166 lb)   LMP 02/21/2019   SpO2 100%   BMI 27.62 kg/m    Physical Exam  GENERAL: healthy, alert and no distress  EYES: Eyes grossly normal to inspection, PERRL and conjunctivae and sclerae normal  HENT: ear canals and TM's normal, nose " and mouth without ulcers or lesions  NECK: no adenopathy, no asymmetry, masses, or scars and thyroid normal to palpation  RESP: lungs clear to auscultation - no rales, rhonchi or wheezes  BREAST: normal without masses, tenderness or nipple discharge and no palpable axillary masses or adenopathy  CV: regular rate and rhythm, normal S1 S2, no S3 or S4, no murmur, click or rub, no peripheral edema and peripheral pulses strong  ABDOMEN: soft, nontender, no hepatosplenomegaly, no masses and bowel sounds normal   (female): normal female external genitalia, normal urethral meatus, vaginal mucosa pink, moist, well rugated, and normal cervix/adnexa/uterus without masses or discharge  MS: no gross musculoskeletal defects noted, no edema  SKIN: Coarse sparse black hair growth anterior mid neck, no suspicious lesions or rashes  NEURO: Normal strength and tone, mentation intact and speech normal  PSYCH: mentation appears normal, affect normal/bright    ASSESSMENT/PLAN:   1. Encounter for routine adult health examination without abnormal findings  Previous negative HIV screen after needle stick exposure at work, no risk for exposure since then   - Wet prep  - Neisseria gonorrhoeae PCR  - Chlamydia trachomatis PCR  - Lipid panel reflex to direct LDL Fasting  - Glucose    2. Spotting  3. Menorrhagia with regular cycle  ? Vascular cervix, rule out uterine abnormality with pelvis us and r/o pelvic infection as above. Consider gyn referral    - US Pelvic Complete w Transvaginal; Future  - Ferritin  - Hemoglobin    4. Excessive hair growth  Consider spironolactone if needed, but only using condoms for contraception currently so might not be ideal mgmt.   - TSH with free T4 reflex  - Prolactin  - DHEA sulfate  - Testosterone total    5. Snoring  6. Apnea  - SLEEP EVALUATION & MANAGEMENT REFERRAL - ADULT -[facility] [phone]  (Age 18 and up); Future    COUNSELING:  Reviewed preventive health counseling, as  "reflected in patient instructions  Special attention given to:        Regular exercise       Healthy diet/nutrition       Vision screening       Hearing screening       Alcohol Use       Contraception       Family planning       Safe sex practices/STD prevention       HIV screeninx in teen years, 1x in adult years, and at intervals if high risk    BP Readings from Last 1 Encounters:   19 102/72     Estimated body mass index is 27.62 kg/m  as calculated from the following:    Height as of this encounter: 1.651 m (5' 5\").    Weight as of this encounter: 75.3 kg (166 lb).      Weight management plan: Discussed healthy diet and exercise guidelines     reports that  has never smoked. she has never used smokeless tobacco.       Patient Instructions   Please call [facility] (formerly called [facility]) at [phone] to schedule your pelvic ultrasound.     Schedule an appointment with the sleep clinic.       Counseling Resources:  ATP IV Guidelines  Pooled Cohorts Equation Calculator  Breast Cancer Risk Calculator  FRAX Risk Assessment  ICSI Preventive Guidelines  Dietary Guidelines for Americans, 2010  USDA's MyPlate  ASA Prophylaxis  Lung CA Screening    The information in this document, created by the medical scribe for me, accurately reflects the services I personally performed and the decisions made by me. I have reviewed and approved this document for accuracy.   Debbie Tan MD  [facility]  "

## 2019-03-08 LAB
C TRACH DNA SPEC QL NAA+PROBE: NEGATIVE
DHEA-S SERPL-MCNC: 207 UG/DL (ref 35–430)
N GONORRHOEA DNA SPEC QL NAA+PROBE: NEGATIVE
SPECIMEN SOURCE: NORMAL
SPECIMEN SOURCE: NORMAL

## 2019-03-10 LAB — TESTOST SERPL-MCNC: 26 NG/DL (ref 8–60)

## 2019-03-15 ENCOUNTER — ANCILLARY PROCEDURE (OUTPATIENT)
Dept: ULTRASOUND IMAGING | Facility: CLINIC | Age: 29
End: 2019-03-15
Attending: FAMILY MEDICINE
Payer: COMMERCIAL

## 2019-03-15 DIAGNOSIS — N92.0 SPOTTING: ICD-10-CM

## 2019-04-16 ENCOUNTER — OFFICE VISIT (OUTPATIENT)
Dept: SLEEP MEDICINE | Facility: CLINIC | Age: 29
End: 2019-04-16
Payer: COMMERCIAL

## 2019-04-16 VITALS
DIASTOLIC BLOOD PRESSURE: 77 MMHG | OXYGEN SATURATION: 99 % | WEIGHT: 166 LBS | TEMPERATURE: 98.1 F | BODY MASS INDEX: 27.66 KG/M2 | HEIGHT: 65 IN | HEART RATE: 75 BPM | SYSTOLIC BLOOD PRESSURE: 111 MMHG | RESPIRATION RATE: 14 BRPM

## 2019-04-16 DIAGNOSIS — R06.81 APNEA: ICD-10-CM

## 2019-04-16 DIAGNOSIS — R06.83 SNORING: ICD-10-CM

## 2019-04-16 PROCEDURE — 99204 OFFICE O/P NEW MOD 45 MIN: CPT | Performed by: PHYSICIAN ASSISTANT

## 2019-04-16 RX ORDER — LEVOCETIRIZINE DIHYDROCHLORIDE 5 MG/1
5 TABLET, FILM COATED ORAL
COMMUNITY
Start: 2017-03-28

## 2019-04-16 ASSESSMENT — MIFFLIN-ST. JEOR: SCORE: 1483.85

## 2019-04-16 NOTE — NURSING NOTE
"Chief Complaint   Patient presents with     Sleep Problem     I have stopped breathing in my sleep at least 3 times that I am aware of       Initial /77   Pulse 75   Temp 98.1  F (36.7  C) (Oral)   Resp 14   Ht 1.651 m (5' 5\")   Wt 75.3 kg (166 lb)   SpO2 99%   BMI 27.62 kg/m   Estimated body mass index is 27.62 kg/m  as calculated from the following:    Height as of this encounter: 1.651 m (5' 5\").    Weight as of this encounter: 75.3 kg (166 lb).    Medication Reconciliation: complete    Neck circumference: 12.5 inches / 32 centimeters.    ESS 4    Sahra Castro MA    "

## 2019-04-16 NOTE — PROGRESS NOTES
Sleep Consultation:    Date on this visit: 4/16/2019    Patricia Cat is sent by Debbie Root for a sleep consultation regarding snoring and apnea.    Primary Physician: Clinic, [facility]     Patricia Cat reports she has stopped breathing in her sleep at least 3 times that she is aware of in the last year. Her medical history is positive for environmental allergies.     She was not aware of 2 of the episodes of pauses in breathing. They occurred last year. Her boyfriend observed the pauses and then a gasp. He then had her roll over. She denies alcohol being involved in those occasions. Last March, she became semi-aware that she hadn't taken a breath in a while so she woke herself with a gasp. She has woken herself with her snoring at other times as well, but not often. That primarily happens if she is napping.    Patricia goes to sleep at 9:30 PM during the week. She wakes up at 6:30 AM often prior to her alarm. Sometimes she wakes a couple of times in the hour before her desired wake time. She falls asleep in 5-10 minutes.  Patricia denies difficulty falling asleep unless she goes up to her cabin. She will take melatonin when she goes there.  She wakes up 1-3 times a night for 1-2 minutes before falling back to sleep.  Patricia wakes up to expecting the alarm going off.  On weekends, Patricia goes to sleep at 11:00 PM.  She wakes up at 8:30 AM without an alarm. She falls asleep in 5-10 minutes.  Patient gets an average of 7-8 hours of sleep per night.     Patient does use electronics in bed and read in bed and does not watch TV in bed and worry in bed about anything.     Patricia does not do shift work.  She works day shifts.  She works for [employer] in [place]. She lives with her sister.    Patricia does snore (estimated 3 nights per week) and snoring is variably loud. If she is snoring loudly, her boyfriend will have her roll over.  Patient does have a semi-regular bed partner. There is report of  gasping.  She does have witnessed apneas. They never sleep separately due to her snoring.  Patient sleeps on her back and side. She has occasional morning dry mouth, denies morning headaches, morning confusion and restless legs. Patricia denies any bruxism, sleep walking, sleep talking, dream enactment, sleep paralysis, cataplexy and hypnogogic/hypnopompic hallucinations.    Patricia has difficulty breathing through her nose, denies claustrophobia, reflux at night, heartburn and depression.  She takes Xyzal for year-round environmental allergies. She was using Flonase as well, but was getting a bloody nose.    Patricia has gained 10 pounds in the last 5 years.  Patient describes themself as a morning person.  She would prefer to go to sleep at 9:30 PM and wake up at 7:00 AM.  Patient's [place] Sleepiness score 4/24 inconsistent with daytime sleepiness.  She does feel sleepy in the afternoon.    Patricia naps 0-1 times per week for 30 minutes, feels refreshed after naps. She takes no inadvertant naps at work.  She denies dozing while driving. Patient was counseled on the importance of driving while alert, to pull over if drowsy, or nap before getting into the vehicle if sleepy.  She uses 1 cups/day of coffee. Last caffeine intake is usually before noon.    Allergies:    No Known Allergies    Medications:    Current Outpatient Medications   Medication Sig Dispense Refill     levocetirizine (XYZAL) 5 MG tablet Take 5 mg by mouth         Problem List:  Patient Active Problem List    Diagnosis Date Noted     Allergic rhinitis, unspecified chronicity, unspecified seasonality, unspecified trigger 02/26/2018     Priority: Medium     Allergy testing in the past       Menorrhagia with regular cycle 02/26/2018     Priority: Medium     History of iron deficiency 02/26/2018     Priority: Medium        Past Medical/Surgical History:  Past Medical History:   Diagnosis Date     Anxiety      Chlamydia      Depression     stopped ocp (trinessa)  and sx's improved. anorgasmia from zoloft, celexa 40 mg was ineffective.      Disc disorder     low spine, s/p PT     Iron deficiency anemia     thought due to heavy menses in past     Lumbosacral radiculopathy at L5      Past Surgical History:   Procedure Laterality Date     ORAL SURGERY REFERRAL      surgery       Social History:  Social History     Socioeconomic History     Marital status: Single     Spouse name: Not on file     Number of children: Not on file     Years of education: Not on file     Highest education level: Not on file   Occupational History     Not on file   Social Needs     Financial resource strain: Not on file     Food insecurity:     Worry: Not on file     Inability: Not on file     Transportation needs:     Medical: Not on file     Non-medical: Not on file   Tobacco Use     Smoking status: Never Smoker     Smokeless tobacco: Never Used   Substance and Sexual Activity     Alcohol use: Yes     Comment: 0-2 drinks/week     Drug use: No     Sexual activity: Yes     Partners: Male   Lifestyle     Physical activity:     Days per week: Not on file     Minutes per session: Not on file     Stress: Not on file   Relationships     Social connections:     Talks on phone: Not on file     Gets together: Not on file     Attends [employer] service: Not on file     Active member of club or organization: Not on file     Attends meetings of clubs or organizations: Not on file     Relationship status: Not on file     Intimate partner violence:     Fear of current or ex partner: Not on file     Emotionally abused: Not on file     Physically abused: Not on file     Forced sexual activity: Not on file   Other Topics Concern     Parent/sibling w/ CABG, MI or angioplasty before 65F 55M? Not Asked   Social History Narrative     Not on file       Family History:  Family History   Problem Relation Age of Onset     Heart Disease Maternal Grandmother      Snoring Maternal Grandmother      Skin Cancer Maternal Grandfather  "     Alzheimer Disease Maternal Grandfather      Alzheimer Disease Paternal Grandmother      Snoring Father        Review of Systems:  A complete review of systems reviewed by me is negative with the exeption of what has been mentioned in the history of present illness.  CONSTITUTIONAL: NEGATIVE for weight loss, fever, chills, sweats or night sweats, drug allergies.  CONSTITUTIONAL:  POSITIVE for  weight gain  EYES: NEGATIVE for changes in vision, blind spots, double vision.  ENT: NEGATIVE for ear pain, sore throat, sinus pain, bloody nose  ENT:  POSITIVE for  post-nasal drip and runny nose  CARDIAC: NEGATIVE for fast heartbeats or fluttering in chest, chest pain or pressure, breathlessness when lying flat, swollen legs or swollen feet.  NEUROLOGIC: NEGATIVE weakness or numbness in the arms or legs.  NEUROLOGIC:  POSITIVE for  headaches  DERMATOLOGIC: NEGATIVE for rashes, new moles or change in mole(s)  PULMONARY: NEGATIVE SOB at rest, SOB with activity, dry cough, productive cough, coughing up blood, wheezing or whistling when breathing.    GASTROINTESTINAL: NEGATIVE for nausea or vomitting, loose or watery stools, fat or grease in stools, constipation, abdominal pain, bowel movements black in color or blood noted.  GENITOURINARY: NEGATIVE for pain during urination, blood in urine, urinating more frequently than usual, irregular menstrual periods.  MUSCULOSKELETAL: NEGATIVE for muscle pain, bone or joint pain, swollen joints.  ENDOCRINE: NEGATIVE for increased thirst or urination, diabetes.  LYMPHATIC: NEGATIVE for swollen lymph nodes, lumps or bumps in the breasts or nipple discharge.    Physical Examination:  Vitals: /77   Pulse 75   Temp 98.1  F (36.7  C) (Oral)   Resp 14   Ht 1.651 m (5' 5\")   Wt 75.3 kg (166 lb)   SpO2 99%   BMI 27.62 kg/m      Neck Cir (cm): 32 cm    [place] Total Score 4/16/2019   Total score - [place] 4       JILLIAN Total Score: 7 (04/16/19 0832)    GENERAL APPEARANCE: healthy, " alert, no distress and cooperative  EYES: Eyes grossly normal to inspection, PERRL, conjunctivae and sclerae normal and lids and lashes normal  HENT: nose and mouth without ulcers or lesions, oral mucous membranes moist and oropharynx slightly small but clear  NECK: no adenopathy, no asymmetry, masses, or scars, thyroid normal to palpation and trachea midline and normal to palpation  RESP: lungs clear to auscultation - no rales, rhonchi or wheezes  CV: regular rates and rhythm, normal S1 S2, no S3 or S4, no murmur, click or rub and no irregular beats  LYMPHATICS: no cervical adenopathy  MS: extremities normal- no gross deformities noted  NEURO: Normal strength and tone, mentation intact, speech normal and cranial nerves 2-12 intact  Mallampati Class: III.  Tonsillar Stage: 1  hidden by pillars.    Impression/Plan:    (R06.83) Snoring, (R06.81) Apnea  Comment: Patricia presents with concerns for sleep apnea. Over the last year, she has been observed to have pauses in breathing twice, and she woke herself with a gasp once as well. She believes she was on her back for all of those events. She generally prefers to sleep laterally. She has woken herself hearing her own snoring on rare occasion when napping as well. She does not know exactly how much she snores as she does not always have a bed partner. She estimates she snores about half of nights and her snoring intensity is variable. She sometimes feels tired in the afternoon, but denies inadvertent dozing. She rarely takes naps. Her ESS is normal at 4/24. Her risk for ELIESER is low. ST[place] BANG score is 2: snoring and observed apnea. Negative risk factors include; no significant sleepiness, no HTN, BMI <35 (27), age <50 (28), neck <40 cm (32 cm), female gender. She does not have a family history of apnea, but her father snores loudly (and is overweight).  Plan: Overnight oximetry study        We will screen with oximetry and perform a sleep study if abnormal. I recommended  she try to sleep supine so we can catch her at her worst.    Literature provided regarding sleep apnea.      I will call with results of the oximetry test and clinical follow up will be determined by those results.       Polysomnography reviewed.  Obstructive sleep apnea reviewed.  Complications of untreated sleep apnea were reviewed.  45 minutes was spent during this visit, over 50% in counseling and coordination of care.   Bennett Goltz, PA-C    CC: Debbie Horner*

## 2019-04-16 NOTE — PATIENT INSTRUCTIONS
"MY TREATMENT INFORMATION FOR SLEEP APNEA-  Patricia Cat    DOCTOR : Bennett Ezra Goltz, PA-C  SLEEP CENTER : [facility]     MY CONTACT NUMBER: [phone]    Am I having a sleep study at a sleep center?  Make sure you have an appointment for the study before you leave!    Am I having a home sleep study?  Watch this video:  https://www.GBS.com/watch?v=CteI_GhyP9g&[email or web address]  Please verify your insurance coverage with your insurance carrier    Frequently asked questions:  1. What is Obstructive Sleep Apnea (ELIESER)? ELIESER is the most common type of sleep apnea. Apnea means, \"without breath.\"  Apnea is most often caused by narrowing or collapse of the upper airway as muscles relax during sleep.   Almost everyone has occasional apneas. Most people with sleep apnea have had brief interruptions at night frequently for many years.  The severity of sleep apnea is related to how frequent and severe the events are.   2. What are the consequences of ELIESER? Symptoms include: feeling sleepy during the day, snoring loudly, gasping or stopping of breathing, trouble sleeping, and occasionally morning headaches or heartburn at night.  Sleepiness can be serious and even increase the risk of falling asleep while driving. Other health consequences may include development of high blood pressure and other cardiovascular disease in persons who are susceptible. Untreated ELIESER  can contribute to heart disease, stroke and diabetes.   3. What are the treatment options? In most situations, sleep apnea is a lifelong disease that must be managed with daily therapy. Medications are not effective for sleep apnea and surgery is generally not considered until other therapies have been tried. Your treatment is your choice. Continuous Positive Airway (CPAP) works right away and is the therapy that is effective in nearly everyone. An oral device to hold your jaw forward is usually the next most reliable option. Other options " include postioning devices (to keep you off your back), weight loss, and surgery including a tongue pacing device. There is more detail about some of these options below.    Important tips for using CPAP and similar devices   Know your equipment:  CPAP is continuous positive airway pressure that prevents obstructive sleep apnea by keeping the throat from collapsing while you are sleeping. In most cases, the device is  smart  and can slowly self-adjusts if your throat collapses and keeps a record every day of how well you are treated-this information is available to you and your care team.  BPAP is bilevel positive airway pressure that keeps your throat open and also assists each breath with a pressure boost to maintain adequate breathing.  Special kinds of BPAP are used in patients who have inadequate breathing from lung or heart disease. In most cases, the device is  smart  and can slowly self-adjusts to assist breathing. Like CPAP, the device keeps a record of how well you are treated.  Your mask is your connection to the device. You get to choose what feels most comfortable and the staff will help to make sure if fits. Here: are some examples of the different masks that are available:       Key points to remember on your journey with sleep apnea:  1. Sleep study.  PAP devices often need to be adjusted during a sleep study to show that they are effective and adjusted right.  2. Good tips to remember: Try wearing just the mask during a quiet time during the day so your body adapts to wearing it. A humidifier is recommended for comfort in most cases to prevent drying of your nose and throat. Allergy medication from your provider may help you if you are having nasal congestion.  3. Getting settled-in. It takes more than one night for most of us to get used to wearing a mask. Try wearing just the mask during a quiet time during the day so your body adapts to wearing it. A humidifier is recommended for comfort in most  cases. Our team will work with you carefully on the first day and will be in contact within 4 days and again at 2 and 4 weeks for advice and remote device adjustments. Your therapy is evaluated by the device each day.   4. Use it every night. The more you are able to sleep naturally for 7-8 hours, the more likely you will have good sleep and to prevent health risks or symptoms from sleep apnea. Even if you use it 4 hours it helps. Occasionally all of us are unable to use a medical therapy, in sleep apnea, it is not dangerous to miss one night.   5. Communicate. Call our skilled team on the number provided on the first day if your visit for problems that make it difficult to wear the device. Over 2 out of 3 patients can learn to wear the device long-term with help from our team. Remember to call our team or your sleep providers if you are unable to wear the device as we may have other solutions for those who cannot adapt to mask CPAP therapy. It is recommended that you sleep your sleep provider within the first 3 months and yearly after that if you are not having problems.   6. Use it for your health. We encourage use of CPAP masks during daytime quiet periods to allow your face and brain to adapt to the sensation of CPAP so that it will be a more natural sensation to awaken to at night or during naps. This can be very useful during the first few weeks or months of adapting to CPAP though it does not help medically to wear CPAP during wakefulness and  should not be used as a strategy just to meet guidelines.  7. Take care of your equipment. Make sure you clean your mask and tubing using directions every day and that your filter and mask are replaced as recommended or if they are not working.     BESIDES CPAP, WHAT OTHER THERAPIES ARE THERE?    Positioning Device  Positioning devices are generally used when sleep apnea is mild and only occurs on your back.This example shows a pillow that straps around the waist. It  may be appropriate for those whose sleep study shows milder sleep apnea that occurs primarily when lying flat on one's back. Preliminary studies have shown benefit but effectiveness at home may need to be verified by a home sleep test. These devices are generally not covered by medical insurance.  Examples of devices that maintain sleeping on the back to prevent snoring and mild sleep apnea.    Belt type body positioner  Http://[employer].com/    Electronic reminder  Http://nightshifttherapy.com/  Http://www.[employer].com.CME.au/      Oral Appliance  What is oral appliance therapy?  An oral appliance device fits on your teeth at night like a retainer used after having braces. The device is made by a specialized dentist and requires several visits over 1-2 months before a manufactured device is made to fit your teeth and is adjusted to prevent your sleep apnea. Once an oral device is working properly, snoring should be improved. A home sleep test may be recommended at that time if to determine whether the sleep apnea is adequately treated.       Some things to remember:  -Oral devices are often, but not always, covered by your medical insurance. Be sure to check with your insurance provider.   -If you are referred for oral therapy, you will be given a list of specialized dentists to consider or you may choose to visit the Web site of the American Academy of Dental Sleep Medicine  -Oral devices are less likely to work if you have severe sleep apnea or are extremely overweight.     More detailed information  An oral appliance is a small acrylic device that fits over the upper and lower teeth  (similar to a retainer or a mouth guard). This device slightly moves jaw forward, which moves the base of the tongue forward, opens the airway, improves breathing for effective treat snoring and obstructive sleep apnea in perhaps 7 out of 10 people .  The best working devices are custom-made by a dental device  after a mold is  made of the teeth 1, 2, 3.  When is an oral appliance indicated?  Oral appliance therapy is recommended as a first-line treatment for patients with primary snoring, mild sleep apnea, and for patients with moderate sleep apnea who prefer appliance therapy to use of CPAP4, 5. Severity of sleep apnea is determined by sleep testing and is based on the number of respiratory events per hour of sleep.   How successful is oral appliance therapy?  The success rate of oral appliance therapy in patients with mild sleep apnea is 75-80% while in patients with moderate sleep apnea it is 50-70%. The chance of success in patients with severe sleep apnea is 40-50%. The research also shows that oral appliances have a beneficial effect on the cardiovascular health of ELIESER patients at the same magnitude as CPAP therapy7.  Oral appliances should be a second-line treatment in cases of severe sleep apnea, but if not completely successful then a combination therapy utilizing CPAP plus oral appliance therapy may be effective. Oral appliances tend to be effective in a broad range of patients although studies show that the patients who have the highest success are females, younger patients, those with milder disease, and less severe obesity. 3, 6.   Finding a dentist that practices dental sleep medicine  Specific training is available through the American Academy of Dental Sleep Medicine for dentists interested in working in the field of sleep. To find a dentist who is educated in the field of sleep and the use of oral appliances, near you, visit the Web site of the American Academy of Dental Sleep Medicine.    References  1. Jae, et al. Objectively measured vs self-reported compliance during oral appliance therapy for sleep-disordered breathing. Chest 2013; 144(5): 9095-9636.  2. Edith et al. Objective measurement of compliance during oral appliance therapy for sleep-disordered breathing. Thorax 2013; 68(1): 91-96.  3. Genesis  et al. Mandibular advancement devices in 620 men and women with ELIESER and snoring: tolerability and predictors of treatment success. Chest 2004; 125: 0189-9513.  4. Joanne et al. Oral appliances for snoring and ELIESER: a review. Sleep 2006; 29: 244-262.  5. Smith et al. Oral appliance treatment for ELIESER: an update. J Clin Sleep Med 2014; 10(2): 215-227.  6. Cassie et al. Predictors of OSAH treatment outcome. J Dent Res 2007; 86: 8242-9037.  Weight Loss:    Weight loss is a long-term strategy that may improve sleep apnea in some patients.    Weight management is a personal decision and the decision should be based on your interest and the potential benefits.  If you are interested in exploring weight loss strategies, the following discussion covers the impact on weight loss on sleep apnea and the approaches that may be successful.    Being overweight does not necessarily mean you will have health consequences.  Those who have BMI over 35 or over 27 with existing medical conditions carries greater risk.   Weight loss decreases severity of sleep apnea in most people with obesity. For those with mild obesity who have developed snoring with weight gain, even 15-30 pound weight loss can improve and occasionally eliminate sleep apnea.  Structured and life-long dietary and health habits are necessary to lose weight and keep healthier weight levels.     Though there may be significant health benefits from weight loss, long-term weight loss is very difficult to achieve- studies show success with dietary management in less than 10% of people. In addition, substantial weight loss may require years of dietary control and may be difficult if patients have severe obesity. In these cases, surgical management may be considered.  Finally, older individuals who have tolerated obesity without health complications may be less likely to benefit from weight loss strategies.      Your BMI is Body mass index is 27.62 kg/m .  Weight  management is a personal decision.  If you are interested in exploring weight loss strategies, the following discussion covers the approaches that may be successful. Body mass index (BMI) is one way to tell whether you are at a healthy weight, overweight, or obese. It measures your weight in relation to your height.  A BMI of 18.5 to 24.9 is in the healthy range. A person with a BMI of 25 to 29.9 is considered overweight, and someone with a BMI of 30 or greater is considered obese. More than two-thirds of American adults are considered overweight or obese.  Being overweight or obese increases the risk for further weight gain. Excess weight may lead to heart disease and diabetes.  Creating and following plans for healthy eating and physical activity may help you improve your health.  Weight control is part of healthy lifestyle and includes exercise, emotional health, and healthy eating habits. Careful eating habits lifelong are the mainstay of weight control. Though there are significant health benefits from weight loss, long-term weight loss with diet alone may be very difficult to achieve- studies show long-term success with dietary management in less than 10% of people. Attaining a healthy weight may be especially difficult to achieve in those with severe obesity. In some cases, medications, devices and surgical management might be considered.  What can you do?  If you are overweight or obese and are interested in methods for weight loss, you should discuss this with your provider.     Consider reducing daily calorie intake by 500 calories.     Keep a food journal.     Avoiding skipping meals, consider cutting portions instead.    Diet combined with exercise helps maintain muscle while optimizing fat loss. Strength training is particularly important for building and maintaining muscle mass. Exercise helps reduce stress, increase energy, and improves fitness. Increasing exercise without diet control, however, may  not burn enough calories to loose weight.       Start walking three days a week 10-20 minutes at a time    Work towards walking thirty minutes five days a week     Eventually, increase the speed of your walking for 1-2 minutes at time    In addition, we recommend that you review healthy lifestyles and methods for weight loss available through the National Institutes of Health patient information sites:  http://win.niddk.nih.gov/publications/index.htm    And look into health and wellness programs that may be available through your health insurance provider, employer, local community center, or jennifer club.    Weight management plan: Patient was referred to their PCP to discuss a diet and exercise plan.  Surgery:  Surgery for obstructive sleep apnea is considered generally only when other therapies fail to work. Surgery may be discussed with you if you are having a difficult time tolerating CPAP and or when there is an abnormal structure that requires surgical correction.  Nose and throat surgeries often enlarge the airway to prevent collapse.  Most of these surgeries create pain for 1-2 weeks and up to half of the most common surgeries are not effective throughout life.  You should carefully discuss the benefits and drawbacks to surgery with your sleep provider and surgeon to determine if it is the best solution for you.   More information  Surgery for ELIESER is directed at areas that are responsible for narrowing or complete obstruction of the airway during sleep.  There are a wide range of procedures available to enlarge and/or stabilize the airway to prevent blockage of breathing in the three major areas where it can occur: the palate, tongue, and nasal regions.  Successful surgical treatment depends on the accurate identification of the factors responsible for obstructive sleep apnea in each person.  A personalized approach is required because there is no single treatment that works well for everyone.  Because of  anatomic variation, consultation with an examination by a sleep surgeon is a critical first step in determining what surgical options are best for each patient.  In some cases, examination during sedation may be recommended in order to guide the selection of procedures.  Patients will be counseled about risks and benefits as well as the typical recovery course after surgery. Surgery is typically not a cure for a person s ELIESER.  However, surgery will often significantly improve one s ELIESER severity (termed  success rate ).  Even in the absence of a cure, surgery will decrease the cardiovascular risk associated with OSA7; improve overall quality of life8 (sleepiness, functionality, sleep quality, etc).  Palate Procedures:  Patients with ELIESER often have narrowing of their airway in the region of their tonsils and uvula.  The goals of palate procedures are to widen the airway in this region as well as to help the tissues resist collapse.  Modern palate procedure techniques focus on tissue conservation and soft tissue rearrangement, rather than tissue removal.  Often the uvula is preserved in this procedure. Residual sleep apnea is common in patient after pharyngoplasty with an average reduction in sleep apnea events of 33%2.    Tongue Procedures:  ExamWhile patients are awake, the muscles that surround the throat are active and keep this region open for breathing. These muscles relax during sleep, allowing the tongue and other structures to collapse and block breathing.  There are several different tongue procedures available.  Selection of a tongue base procedure depends on characteristics seen on physical exam.  Generally, procedures are aimed at removing bulky tissues in this area or preventing the back of the tongue from falling back during sleep.  Success rates for tongue surgery range from 50-62%3.  Hypoglossal Nerve Stimulation:  Hypoglossal nerve stimulation has recently received approval from the [place] Food  and Drug Administration for the treatment of obstructive sleep apnea.  This is based on research showing that the system was safe and effective in treating sleep apnea6.  Results showed that the median AHI score decreased 68%, from 29.3 to 9.0. This therapy uses an implant system that senses breathing patterns and delivers mild stimulation to airway muscles, which keeps the airway open during sleep.  The system consists of three fully implanted components: a small generator (similar in size to a pacemaker), a breathing sensor, and a stimulation lead.  Using a small handheld remote, a patient turns the therapy on before bed and off upon awakening.    Candidates for this device must be greater than 22 years of age, have moderate to severe ELIESER (AHI between 20-65), BMI less than 32, have tried CPAP/oral appliance without success, and have appropriate upper airway anatomy (determined by a sleep endoscopy performed by Dr. Glass).  Hypoglossal Nerve Stimulation Pathway:    The sleep surgeon s office will work with the patient through the insurance prior-authorization process (including communications and appeals).    Nasal Procedures:  Nasal obstruction can interfere with nasal breathing during the day and night.  Studies have shown that relief of nasal obstruction can improve the ability of some patients to tolerate positive airway pressure therapy for obstructive sleep apnea1.  Treatment options include medications such as nasal saline, topical corticosteroid and antihistamine sprays, and oral medications such as antihistamines or decongestants. Non-surgical treatments can include external nasal dilators for selected patients. If these are not successful by themselves, surgery can improve the nasal airway either alone or in combination with these other options.  Combination Procedures:  Combination of surgical procedures and other treatments may be recommended, particularly if patients have more than one area of narrowing  or persistent positional disease.  The success rate of combination surgery ranges from 66-80%2,3.    References  1. Esteban VILLANUEVA. The Role of the Nose in Snoring and Obstructive Sleep Apnoea: An Update.  Eur Arch Otorhinolaryngol. 2011; 268: 1365-73.  2.  Luis SM; Jason JA; Audie JR; Pallanch JF; Isidra MB; Alexandru SG; Kenji WAYNE. Surgical modifications of the upper airway for obstructive sleep apnea in adults: a systematic review and meta-analysis. SLEEP 2010;33(10):4805-5748. Lay SILVA. Hypopharyngeal surgery in obstructive sleep apnea: an evidence-based medicine review.  Arch Otolaryngol Head Neck Surg. 2006 Feb;132(2):206-13.  3. Seven YH1, Guanaco Y, Chris FLORENTIN. The efficacy of anatomically based multilevel surgery for obstructive sleep apnea. Otolaryngol Head Neck Surg. 2003 Oct;129(4):327-35.  4. Lay SILVA, Goldberg A. Hypopharyngeal Surgery in Obstructive Sleep Apnea: An Evidence-Based Medicine Review. Arch Otolaryngol Head Neck Surg. 2006 Feb;132(2):206-13.  5. Lia PJ et al. Upper-Airway Stimulation for Obstructive Sleep Apnea.  N Engl J Med. 2014 Jan 9;370(2):139-49.  6. Maritza Y et al. Increased Incidence of Cardiovascular Disease in Middle-aged Men with Obstructive Sleep Apnea. Am J Respir Crit Care Med; 2002 166: 159-165  7. Gabby EM et al. Studying Life Effects and Effectiveness of Palatopharyngoplasty (SLEEP) study: Subjective Outcomes of Isolated Uvulopalatopharyngoplasty. Otolaryngol Head Neck Surg. 2011; 144: 623-631.

## 2019-04-22 ENCOUNTER — OFFICE VISIT (OUTPATIENT)
Dept: SLEEP MEDICINE | Facility: CLINIC | Age: 29
End: 2019-04-22
Attending: PHYSICIAN ASSISTANT
Payer: COMMERCIAL

## 2019-04-22 DIAGNOSIS — R06.81 APNEA: ICD-10-CM

## 2019-04-22 DIAGNOSIS — R06.83 SNORING: ICD-10-CM

## 2019-04-23 ENCOUNTER — DOCUMENTATION ONLY (OUTPATIENT)
Dept: SLEEP MEDICINE | Facility: CLINIC | Age: 29
End: 2019-04-23
Payer: COMMERCIAL

## 2019-04-29 NOTE — NURSING NOTE
Overnight oximetry completed by patient.      Note: Download successful. Copy given to provider and scanned in to chart.        Alicia Simonham  [facility] - Specialist

## 2019-04-29 NOTE — PROGRESS NOTES
Patient instructed on CHILO use. Patient demonstrated and verbalized knowledge of use. Insurance was verified by financial securing. Device programmed. Device will be returned tomorrow before noon.

## 2019-05-09 ENCOUNTER — MYC MEDICAL ADVICE (OUTPATIENT)
Dept: SLEEP MEDICINE | Facility: CLINIC | Age: 29
End: 2019-05-09

## 2019-05-10 NOTE — TELEPHONE ENCOUNTER
I called and left a message asking for her to check MyChart.  Anthony    Oximetry results were obtained for the date of 4/22/2019.  The patient was on room air to screen for ELIESER.  The study showed a valid recording time of 9:18 with a 4% desaturation index of 0.3/hr.  The mean oxygen saturation was 95.6% and the lowest SpO2 was 90% (the report says 83%, but that is not apparent on the graph).  The patient spent 0 minutes below 89% SpO2, 0 minutes below 90% (the report says 0.3 minutes below 89%, but that again does not appear to be true based on the graph).    This test does not show any signs of significant apnea or hypoxemia.  Bennett Goltz, PA-C

## 2019-05-10 NOTE — TELEPHONE ENCOUNTER
Sahra Castro 5/10/2019 8:08 AM CDT    FYI  ----- Message -----  From: Patricia Cat  Sent: 5/9/2019 8:56 PM  To: Maria Esther Griffith  Subject: Updates about my health     ----- Message from Maryellen Griffith sent at 5/9/2019 8:56 PM CDT -----    Hi,    I haven't heard anything about my oximeter results from last month, no rush, just want to make sure I didn't miss the results.    Thanks,    Patricia Cat

## 2019-09-28 ENCOUNTER — HEALTH MAINTENANCE LETTER (OUTPATIENT)
Age: 29
End: 2019-09-28

## 2019-10-10 ENCOUNTER — MYC MEDICAL ADVICE (OUTPATIENT)
Dept: FAMILY MEDICINE | Facility: CLINIC | Age: 29
End: 2019-10-10

## 2019-11-26 ENCOUNTER — OFFICE VISIT (OUTPATIENT)
Dept: OBGYN | Facility: CLINIC | Age: 29
End: 2019-11-26
Payer: COMMERCIAL

## 2019-11-26 VITALS
BODY MASS INDEX: 28.79 KG/M2 | SYSTOLIC BLOOD PRESSURE: 118 MMHG | WEIGHT: 173 LBS | OXYGEN SATURATION: 97 % | HEART RATE: 83 BPM | DIASTOLIC BLOOD PRESSURE: 72 MMHG

## 2019-11-26 DIAGNOSIS — N93.0 PCB (POST COITAL BLEEDING): Primary | ICD-10-CM

## 2019-11-26 PROCEDURE — 99203 OFFICE O/P NEW LOW 30 MIN: CPT | Performed by: OBSTETRICS & GYNECOLOGY

## 2019-11-26 NOTE — PROGRESS NOTES
Subjective  29 year old non-pregnant female presents today complaining of post coital bleeding.  She has noticed this for the last year or more.  She is not with a new partner.  Same partner for 2+ years.  This is new for her in the sense that she never had this since being sexually active.  Negative STD testing.  Normal ultrasound in March.  We reviewed this again.  She notices the bleeding sometimes during intercourse when it is heavy like a period and other times after intercourse with wiping.  She is annoyed that her underwear are constantly ruined.  Her mother had the same issues at her age.  No pregnancies.  No change in this bleeding with position changes or during the time of her cycle.  Patient uses condoms.   She stopped birth control 5 years ago.   No piercings or toys.  Normal pap smear in 2018 and no history of abnormal paps.  No female cancer in her family.  Last intercourse was 2 days ago.  We discussed possibly doing a colposcopy if it persists and she will think about it.       ROS: 10 point ROS neg other than the symptoms noted above in the HPI.  Past Medical History:   Diagnosis Date     Anxiety      Chlamydia      Depression     stopped ocp (trinessa) and sx's improved. anorgasmia from zoloft, celexa 40 mg was ineffective.      Disc disorder     low spine, s/p PT     Iron deficiency anemia     thought due to heavy menses in past     Lumbosacral radiculopathy at L5      Past Surgical History:   Procedure Laterality Date     ORAL SURGERY REFERRAL      surgery     Family History   Problem Relation Age of Onset     Heart Disease Maternal Grandmother      Snoring Maternal Grandmother      Skin Cancer Maternal Grandfather      Alzheimer Disease Maternal Grandfather      Alzheimer Disease Paternal Grandmother      Snoring Father      Social History     Tobacco Use     Smoking status: Never Smoker     Smokeless tobacco: Never Used   Substance Use Topics     Alcohol use: Yes     Comment: 0-2 drinks/week          Objective  Vitals: /72   Pulse 83   Wt 78.5 kg (173 lb)   LMP 11/04/2019   SpO2 97%   BMI 28.79 kg/m    BMI= Body mass index is 28.79 kg/m .    General appearance=well developed, well-nourished female  Gait=normal  Psych=mood is stable, alert and oriented x3  HEENT=mucous membranes moist  Skin=no rashes or lesions seen,normal turgor   Neck=overall appearance is normal  Thyroid=no enlargement  Lungs=non-labored breathing, no use of accessory muscles   Abd=soft, Nontender/nondistended, no masses, no signs of hernias, no evidence of hepatosplenomegaly  PELVIC:    External genitalia: normal without lesions or masses  Urethral meatus: no lesions or prolapse noted, normal size  Urethra: no masses, non tender  Bladder: non tender, no fullness  Vagina: normal mucosa and rugae, no discharge.  Cervix: normal without lesion, no cervical motion tenderness, healthy, nulliparous, friable, bleeding with speculum and digital exam  Uterus: small, mobile, nontender.  Adnexa: non tender, without masses  Rectal: deffered  Ext=no clubbing or cyanosis, no swelling      Pelvic ultrasound=3/15/19:  FINDINGS: The uterus measures 8.0 x 5.5 x 3.3 cm. The endometrial  stripe measures 5-6 mm. No abnormal uterine masses identified.     The right ovary measures 2.6 x 1.4 x 2.4 cm. There is normal low  resistance arterial flow to the right ovary. No right adnexal mass is  identified.     The left ovary measures 2.4 x 3.6 x 2.3 cm. There is normal low  resistance arterial flow to the left ovary. No left adnexal mass is  identified. Mildly increased left ovarian vascularity, presumably due  to the secretory phase.     No free fluid is identified within the cul-de-sac.                                                                      IMPRESSION:   Normal pelvic ultrasound.      Assessment  1.)  Post coital bleeding=likely due to friable cervix, speculum caused spotting as did digital exam, consider colpo       Plan  1.)   Consider colposcopy      30 minutes was spent face to face with the patient today discussing her history, diagnosis, and follow-up plan as noted above.  Over 50% of the visit was spent in counseling and coordination of care.      Nursing notes read and reviewed    Shantell Winn DO

## 2019-11-26 NOTE — PATIENT INSTRUCTIONS
If you have any questions regarding your visit, Please contact your care team.    Women s Health CLINIC HOURS TELEPHONE NUMBER   Shantell Winn M.D.    Norma Adan           Monday-[facility]  7:00 am - 5 pm Monday's Tuesday- [facility]  8:00am- 5 pm  Wednesday- Off  Thursday- Off Surgery  Friday-Am [place], and [facility]  [place] 8:00-11:30 AM  [place] 1:00-5:00 PM [facility]  [address] [place] [address]  [phone] ask for [facility]    Imaging [id number]    [facility]  [address] [place], [place] [id number] [phone]  Imaging [phone]    [facility]  [address] [place], [place] [id number] [phone]  Imaging Scheduling - [phone]     Urgent Care locations:    [facility] Saturday and Sunday   9 am - 5 pm    Monday-Friday   12 pm - 8 pm  Saturday and Sunday   9 am - 5 pm   [phone]       If you need a medication refill, please contact your pharmacy. Please allow 3 business days for your refill to be completed.  As always, Thank you for trusting us with your healthcare needs!

## 2020-03-09 ASSESSMENT — ENCOUNTER SYMPTOMS
CONSTIPATION: 0
DIARRHEA: 0
EYE PAIN: 0
HEMATOCHEZIA: 0
SHORTNESS OF BREATH: 0
MYALGIAS: 0
PARESTHESIAS: 0
NAUSEA: 0
BREAST MASS: 0
HEMATURIA: 0
SORE THROAT: 0
ABDOMINAL PAIN: 0
HEARTBURN: 0
DIZZINESS: 0
HEADACHES: 0
FREQUENCY: 0
COUGH: 0
FEVER: 0
NERVOUS/ANXIOUS: 0
JOINT SWELLING: 0
DYSURIA: 0
ARTHRALGIAS: 0
CHILLS: 0
PALPITATIONS: 0
WEAKNESS: 0

## 2020-03-10 ENCOUNTER — OFFICE VISIT (OUTPATIENT)
Dept: FAMILY MEDICINE | Facility: CLINIC | Age: 30
End: 2020-03-10
Payer: COMMERCIAL

## 2020-03-10 VITALS
OXYGEN SATURATION: 100 % | TEMPERATURE: 98.6 F | SYSTOLIC BLOOD PRESSURE: 109 MMHG | WEIGHT: 174 LBS | RESPIRATION RATE: 16 BRPM | HEIGHT: 65 IN | BODY MASS INDEX: 28.99 KG/M2 | HEART RATE: 65 BPM | DIASTOLIC BLOOD PRESSURE: 73 MMHG

## 2020-03-10 DIAGNOSIS — L70.9 ACNE, UNSPECIFIED ACNE TYPE: ICD-10-CM

## 2020-03-10 DIAGNOSIS — N93.0 PCB (POST COITAL BLEEDING): ICD-10-CM

## 2020-03-10 DIAGNOSIS — Z00.00 ENCOUNTER FOR ROUTINE ADULT HEALTH EXAMINATION WITHOUT ABNORMAL FINDINGS: Primary | ICD-10-CM

## 2020-03-10 DIAGNOSIS — Z12.4 SCREENING FOR CERVICAL CANCER: ICD-10-CM

## 2020-03-10 DIAGNOSIS — Z11.3 SCREEN FOR STD (SEXUALLY TRANSMITTED DISEASE): ICD-10-CM

## 2020-03-10 LAB
HCV AB SERPL QL IA: NONREACTIVE
HIV 1+2 AB+HIV1 P24 AG SERPL QL IA: NONREACTIVE
SPECIMEN SOURCE: ABNORMAL
T PALLIDUM AB SER QL: NONREACTIVE
WET PREP SPEC: ABNORMAL

## 2020-03-10 PROCEDURE — 99395 PREV VISIT EST AGE 18-39: CPT | Performed by: FAMILY MEDICINE

## 2020-03-10 PROCEDURE — 86803 HEPATITIS C AB TEST: CPT | Performed by: FAMILY MEDICINE

## 2020-03-10 PROCEDURE — 87210 SMEAR WET MOUNT SALINE/INK: CPT | Performed by: FAMILY MEDICINE

## 2020-03-10 PROCEDURE — G0145 SCR C/V CYTO,THINLAYER,RESCR: HCPCS | Performed by: FAMILY MEDICINE

## 2020-03-10 PROCEDURE — 87591 N.GONORRHOEAE DNA AMP PROB: CPT | Performed by: FAMILY MEDICINE

## 2020-03-10 PROCEDURE — 87491 CHLMYD TRACH DNA AMP PROBE: CPT | Performed by: FAMILY MEDICINE

## 2020-03-10 PROCEDURE — 36415 COLL VENOUS BLD VENIPUNCTURE: CPT | Performed by: FAMILY MEDICINE

## 2020-03-10 PROCEDURE — 86780 TREPONEMA PALLIDUM: CPT | Performed by: FAMILY MEDICINE

## 2020-03-10 PROCEDURE — 87389 HIV-1 AG W/HIV-1&-2 AB AG IA: CPT | Performed by: FAMILY MEDICINE

## 2020-03-10 ASSESSMENT — ENCOUNTER SYMPTOMS
HEADACHES: 0
NAUSEA: 0
WEAKNESS: 0
CONSTIPATION: 0
MYALGIAS: 0
HEMATURIA: 0
JOINT SWELLING: 0
HEMATOCHEZIA: 0
ARTHRALGIAS: 0
DIARRHEA: 0
HEARTBURN: 0
DYSURIA: 0
DIZZINESS: 0
SHORTNESS OF BREATH: 0
BREAST MASS: 0
COUGH: 0
FREQUENCY: 0
ABDOMINAL PAIN: 0
PARESTHESIAS: 0
FEVER: 0
PALPITATIONS: 0
NERVOUS/ANXIOUS: 0
SORE THROAT: 0
EYE PAIN: 0
CHILLS: 0

## 2020-03-10 ASSESSMENT — PAIN SCALES - GENERAL: PAINLEVEL: NO PAIN (0)

## 2020-03-10 ASSESSMENT — MIFFLIN-ST. JEOR: SCORE: 1519.1

## 2020-03-10 NOTE — PROGRESS NOTES
SUBJECTIVE:   CC: Patricia Cat is an 29 year old woman who presents for preventive health visit.     Healthy Habits:     Getting at least 3 servings of Calcium per day:  Yes    Bi-annual eye exam:  NO    Dental care twice a year:  Yes    Sleep apnea or symptoms of sleep apnea:  None    Diet:  Regular (no restrictions)    Frequency of exercise:  4-5 days/week    Duration of exercise:  Greater than 60 minutes    Taking medications regularly:  Yes    Medication side effects:  None    PHQ-2 Total Score: 0    Additional concerns today:  No    -Pt is going to [place] in May 2020, she has an upcoming travel clinic appointment.   -She is also wondering how long she should wait to try having children after she returns due to zika     HENT  -Pt uses xyzal for allergies. She has noticed an increase in congestion randomly throughout the day. She used to be on Flonase and Nasacort but stopped using them 4-5 months ago.       -Pt uses condoms for birth control, she is not interested in other contraceptive measures  -Pt's periods are regular, she does experience some mild cramps. She has noticed an increase in acne flares before her periods. She uses Neutrogena acne wash.   -Continues to bleed/spot after intercourse, saw obgyn on 11/26/2019 for this - not necessarily worrisome, but recommended colposcopy if the bleeding persists. Denies pain with intercourse     Weight   -Pt has been working out more and training for a half-marathon, she runs a couple miles every other day. She notes that she has been gaining weight and she thinks it might be due to overeating.   Wt Readings from Last 4 Encounters:   03/10/20 78.9 kg (174 lb)   11/26/19 78.5 kg (173 lb)   04/16/19 75.3 kg (166 lb)   03/07/19 75.3 kg (166 lb)     Additional:   -Vision is okay, pt has an upcoming eye appointment. She is considering getting Lasix.       Today's PHQ-2 Score:   PHQ-2 ( 1999 Pfizer) 3/9/2020   Q1: Little interest or pleasure in doing things 0    Q2: Feeling down, depressed or hopeless 0   PHQ-2 Score 0   Q1: Little interest or pleasure in doing things Not at all   Q2: Feeling down, depressed or hopeless Not at all   PHQ-2 Score 0       Abuse: Current or Past(Physical, Sexual or Emotional)- No  Do you feel safe in your environment? Yes        Social History     Tobacco Use     Smoking status: Never Smoker     Smokeless tobacco: Never Used   Substance Use Topics     Alcohol use: Yes     Comment: 0-2 drinks/week         Alcohol Use 3/9/2020   Prescreen: >3 drinks/day or >7 drinks/week? No   Prescreen: >3 drinks/day or >7 drinks/week? -       Reviewed orders with patient.  Reviewed health maintenance and updated orders accordingly - Yes  Patient Active Problem List   Diagnosis     Allergic rhinitis, unspecified chronicity, unspecified seasonality, unspecified trigger     Menorrhagia with regular cycle     History of iron deficiency     PCB (post coital bleeding)     Past Surgical History:   Procedure Laterality Date     ORAL SURGERY REFERRAL      surgery       Social History     Tobacco Use     Smoking status: Never Smoker     Smokeless tobacco: Never Used   Substance Use Topics     Alcohol use: Yes     Comment: 0-2 drinks/week     Family History   Problem Relation Age of Onset     Heart Disease Maternal Grandmother      Snoring Maternal Grandmother      Skin Cancer Maternal Grandfather      Alzheimer Disease Maternal Grandfather      Alzheimer Disease Paternal Grandmother      Snoring Father            Mammogram not appropriate for this patient based on age.    Pertinent mammograms are reviewed under the imaging tab.  History of abnormal Pap smear: NO - age 21-29 PAP every 3 years recommended  PAP / HPV 2/26/2018   PAP NIL     Reviewed and updated as needed this visit by clinical staff  Tobacco  Allergies  Meds  Med Hx  Surg Hx  Fam Hx  Soc Hx        Reviewed and updated as needed this visit by Provider            Review of Systems   Constitutional:  "Negative for chills and fever.   HENT: Positive for congestion. Negative for ear pain, hearing loss and sore throat.    Eyes: Negative for pain and visual disturbance.   Respiratory: Negative for cough and shortness of breath.    Cardiovascular: Negative for chest pain, palpitations and peripheral edema.   Gastrointestinal: Negative for abdominal pain, constipation, diarrhea, heartburn, hematochezia and nausea.   Breasts:  Negative for tenderness, breast mass and discharge.   Genitourinary: Positive for vaginal bleeding. Negative for dysuria, frequency, genital sores, hematuria, pelvic pain, urgency and vaginal discharge.   Musculoskeletal: Negative for arthralgias, joint swelling and myalgias.   Skin: Negative for rash.   Neurological: Negative for dizziness, weakness, headaches and paresthesias.   Psychiatric/Behavioral: Negative for mood changes. The patient is not nervous/anxious.      This document serves as a record of the services and decisions personally performed by BONILLA HINKLE. It was created on his/her behalf by Paula Nguyen, a trained medical scribe. The creation of this document is based on the provider's statements to the medical scribe. Paula Nguyen, March 10, 2020 7:08 AM     OBJECTIVE:   /73 (BP Location: Right arm, Patient Position: Chair, Cuff Size: Adult Regular)   Pulse 65   Temp 98.6  F (37  C) (Oral)   Resp 16   Ht 1.657 m (5' 5.25\")   Wt 78.9 kg (174 lb)   LMP 02/17/2020 (Exact Date)   SpO2 100%   Breastfeeding No   BMI 28.73 kg/m    Physical Exam  GENERAL: healthy, alert and no distress  EYES: Eyes grossly normal to inspection, PERRL and conjunctivae and sclerae normal  HENT: ear canals and TM's normal, nose and mouth without ulcers or lesions, nasal mucosal edema   NECK: no adenopathy, no asymmetry, masses, or scars and thyroid normal to palpation  RESP: lungs clear to auscultation - no rales, rhonchi or wheezes  BREAST: normal without masses, tenderness or " nipple discharge and no palpable axillary masses or adenopathy  CV: regular rate and rhythm, normal S1 S2, no S3 or S4, no murmur, click or rub, no peripheral edema and peripheral pulses strong  ABDOMEN: soft, nontender, no hepatosplenomegaly, no masses and bowel sounds normal   (female): normal female external genitalia, normal urethral meatus, vaginal mucosa pink, moist, well rugated, and normal cervix/adnexa/uterus without masses or discharge, hypervascular appearance of cervix    MS: no gross musculoskeletal defects noted, no edema  SKIN: no suspicious lesions or rashes  NEURO: Normal strength and tone, mentation intact and speech normal  PSYCH: mentation appears normal, affect normal/bright    Diagnostic Test Results:  Labs reviewed in Epic    ASSESSMENT/PLAN:   1. Encounter for routine adult health examination without abnormal findings  Reviewed health maintenance.     2. Screen for STD (sexually transmitted disease)  - Wet prep  - Neisseria gonorrhoeae PCR  - Chlamydia trachomatis PCR  - HIV Antigen Antibody Combo  - Hepatitis C antibody  - Treponema Abs w Reflex to RPR and Titer    3. Screening for cervical cancer  - Pap imaged thin layer screen reflex to HPV if ASCUS - recommend age 25 - 29    4. PCB (post coital bleeding)  Pt saw obgyn in November 2019, recommended to return for colposcopy if symptoms persist. Reminded pt of this and she voices understanding.   - Pap imaged thin layer screen reflex to HPV if ASCUS - recommend age 25 - 29    5. Acne, unspecified acne type  Discussed otc benzoyl peroxide face wash, Differin gel, and moisturizers for treatment. Pt voices understanding.     COUNSELING:  Reviewed preventive health counseling, as reflected in patient instructions       Regular exercise       Healthy diet/nutrition       Vision screening       Hearing screening       Immunizations       Alcohol Use       Contraception       Safe sex practices/STD prevention    Estimated body mass index is 28.73  "kg/m  as calculated from the following:    Height as of this encounter: 1.657 m (5' 5.25\").    Weight as of this encounter: 78.9 kg (174 lb).    Weight management plan: Discussed healthy diet and exercise guidelines     reports that she has never smoked. She has never used smokeless tobacco.    Patient Instructions   Vaccines the travel clinic will likely recommend: Hep A, Hep B, yellow fever, malaria, MMR booster, typhoid     You can try benadryl or melatonin to help you sleep on the airplane.     I recommend you restart an allergy nasal spray, such as Flonase or Nasacort. You can also try a saline nasal spray.    I recommend a Benoxyl peroxide face wash and Differin gel. You can get both over the counter. Make sure you use a good moisturizer, such as Cetaphil light moisturizer.           Counseling Resources:  ATP IV Guidelines  Pooled Cohorts Equation Calculator  Breast Cancer Risk Calculator  FRAX Risk Assessment  ICSI Preventive Guidelines  Dietary Guidelines for Americans, 2010  USDA's MyPlate  ASA Prophylaxis  Lung CA Screening    The information in this document, created by the medical scribe for me, accurately reflects the services I personally performed and the decisions made by me. I have reviewed and approved this document for accuracy.   Debbie Tan MD  [facility]  "

## 2020-03-10 NOTE — PATIENT INSTRUCTIONS
Vaccines the travel clinic will likely recommend: Hep A, Hep B, yellow fever, malaria, MMR booster, typhoid     You can try benadryl or melatonin to help you sleep on the airplane.     I recommend you restart an allergy nasal spray, such as Flonase or Nasacort. You can also try a saline nasal spray.    I recommend a Benoxyl peroxide face wash and Differin gel. You can get both over the counter. Make sure you use a good moisturizer, such as Cetaphil light moisturizer.

## 2020-03-11 ENCOUNTER — MYC MEDICAL ADVICE (OUTPATIENT)
Dept: FAMILY MEDICINE | Facility: CLINIC | Age: 30
End: 2020-03-11

## 2020-03-11 DIAGNOSIS — B96.89 BV (BACTERIAL VAGINOSIS): Primary | ICD-10-CM

## 2020-03-11 DIAGNOSIS — N76.0 BV (BACTERIAL VAGINOSIS): Primary | ICD-10-CM

## 2020-03-11 LAB
C TRACH DNA SPEC QL NAA+PROBE: NEGATIVE
N GONORRHOEA DNA SPEC QL NAA+PROBE: NEGATIVE
SPECIMEN SOURCE: NORMAL
SPECIMEN SOURCE: NORMAL

## 2020-03-11 RX ORDER — METRONIDAZOLE 500 MG/1
500 TABLET ORAL 2 TIMES DAILY
Qty: 14 TABLET | Refills: 0 | Status: SHIPPED | OUTPATIENT
Start: 2020-03-11 | End: 2020-03-18

## 2020-03-12 LAB
COPATH REPORT: NORMAL
PAP: NORMAL

## 2020-03-21 ENCOUNTER — E-VISIT (OUTPATIENT)
Dept: FAMILY MEDICINE | Facility: CLINIC | Age: 30
End: 2020-03-21
Payer: COMMERCIAL

## 2020-03-21 DIAGNOSIS — B37.0 THRUSH: Primary | ICD-10-CM

## 2020-03-21 PROCEDURE — 99421 OL DIG E/M SVC 5-10 MIN: CPT | Performed by: FAMILY MEDICINE

## 2020-03-23 RX ORDER — CLOTRIMAZOLE 10 MG/1
10 LOZENGE ORAL
Qty: 35 TROCHE | Refills: 0 | Status: SHIPPED | OUTPATIENT
Start: 2020-03-23 | End: 2020-03-30

## 2020-03-28 ENCOUNTER — E-VISIT (OUTPATIENT)
Dept: FAMILY MEDICINE | Facility: CLINIC | Age: 30
End: 2020-03-28
Payer: COMMERCIAL

## 2020-03-28 DIAGNOSIS — B37.0 THRUSH, ORAL: ICD-10-CM

## 2020-03-28 DIAGNOSIS — B37.0 THRUSH: Primary | ICD-10-CM

## 2020-03-28 PROCEDURE — 99421 OL DIG E/M SVC 5-10 MIN: CPT | Performed by: FAMILY MEDICINE

## 2020-03-30 RX ORDER — NYSTATIN 100000/ML
500000 SUSPENSION, ORAL (FINAL DOSE FORM) ORAL 4 TIMES DAILY
Qty: 200 ML | Refills: 0 | Status: SHIPPED | OUTPATIENT
Start: 2020-03-30 | End: 2020-04-09

## 2020-07-06 ENCOUNTER — E-VISIT (OUTPATIENT)
Dept: FAMILY MEDICINE | Facility: CLINIC | Age: 30
End: 2020-07-06
Payer: COMMERCIAL

## 2020-07-06 ENCOUNTER — MYC MEDICAL ADVICE (OUTPATIENT)
Dept: FAMILY MEDICINE | Facility: CLINIC | Age: 30
End: 2020-07-06

## 2020-07-06 DIAGNOSIS — L70.9 ACNE, UNSPECIFIED ACNE TYPE: Primary | ICD-10-CM

## 2020-07-06 PROCEDURE — 99422 OL DIG E/M SVC 11-20 MIN: CPT | Performed by: FAMILY MEDICINE

## 2020-07-07 RX ORDER — CLINDAMYCIN PHOSPHATE 10 MG/G
GEL TOPICAL 2 TIMES DAILY
Qty: 30 G | Refills: 5 | Status: SHIPPED | OUTPATIENT
Start: 2020-07-07

## 2021-01-10 ENCOUNTER — HEALTH MAINTENANCE LETTER (OUTPATIENT)
Age: 31
End: 2021-01-10

## 2021-05-08 ENCOUNTER — HEALTH MAINTENANCE LETTER (OUTPATIENT)
Age: 31
End: 2021-05-08

## 2021-10-23 ENCOUNTER — HEALTH MAINTENANCE LETTER (OUTPATIENT)
Age: 31
End: 2021-10-23

## 2022-06-04 ENCOUNTER — HEALTH MAINTENANCE LETTER (OUTPATIENT)
Age: 32
End: 2022-06-04

## 2022-10-10 ENCOUNTER — HEALTH MAINTENANCE LETTER (OUTPATIENT)
Age: 32
End: 2022-10-10

## 2023-06-10 ENCOUNTER — HEALTH MAINTENANCE LETTER (OUTPATIENT)
Age: 33
End: 2023-06-10